# Patient Record
Sex: MALE | Race: WHITE | Employment: OTHER | URBAN - METROPOLITAN AREA
[De-identification: names, ages, dates, MRNs, and addresses within clinical notes are randomized per-mention and may not be internally consistent; named-entity substitution may affect disease eponyms.]

---

## 2017-01-23 ENCOUNTER — HOSPITAL ENCOUNTER (OUTPATIENT)
Age: 72
Setting detail: OBSERVATION
Discharge: HOME | End: 2017-01-25
Attending: EMERGENCY MEDICINE | Admitting: INTERNAL MEDICINE
Payer: MEDICARE

## 2017-01-23 ENCOUNTER — APPOINTMENT (OUTPATIENT)
Dept: CT IMAGING | Age: 72
End: 2017-01-23
Attending: INTERNAL MEDICINE
Payer: MEDICARE

## 2017-01-23 DIAGNOSIS — R55 NEAR SYNCOPE: Primary | ICD-10-CM

## 2017-01-23 LAB
ALBUMIN SERPL BCP-MCNC: 3.8 G/DL (ref 3.5–5)
ALBUMIN/GLOB SERPL: 1 {RATIO} (ref 1.1–2.2)
ALP SERPL-CCNC: 68 U/L (ref 45–117)
ALT SERPL-CCNC: 38 U/L (ref 12–78)
ANION GAP BLD CALC-SCNC: 8 MMOL/L (ref 5–15)
AST SERPL W P-5'-P-CCNC: 24 U/L (ref 15–37)
ATRIAL RATE: 69 BPM
BASOPHILS # BLD AUTO: 0 K/UL (ref 0–0.1)
BASOPHILS # BLD: 0 % (ref 0–1)
BILIRUB SERPL-MCNC: 0.6 MG/DL (ref 0.2–1)
BUN SERPL-MCNC: 16 MG/DL (ref 6–20)
BUN/CREAT SERPL: 15 (ref 12–20)
CALCIUM SERPL-MCNC: 9.2 MG/DL (ref 8.5–10.1)
CALCULATED P AXIS, ECG09: 47 DEGREES
CALCULATED R AXIS, ECG10: 20 DEGREES
CALCULATED T AXIS, ECG11: 53 DEGREES
CHLORIDE SERPL-SCNC: 103 MMOL/L (ref 97–108)
CK MB CFR SERPL CALC: 2.8 % (ref 0–2.5)
CK MB SERPL-MCNC: 2.9 NG/ML (ref 5–25)
CK SERPL-CCNC: 104 U/L (ref 39–308)
CO2 SERPL-SCNC: 28 MMOL/L (ref 21–32)
CREAT SERPL-MCNC: 1.08 MG/DL (ref 0.7–1.3)
DIAGNOSIS, 93000: NORMAL
EOSINOPHIL # BLD: 0.2 K/UL (ref 0–0.4)
EOSINOPHIL NFR BLD: 2 % (ref 0–7)
ERYTHROCYTE [DISTWIDTH] IN BLOOD BY AUTOMATED COUNT: 13.4 % (ref 11.5–14.5)
GLOBULIN SER CALC-MCNC: 3.9 G/DL (ref 2–4)
GLUCOSE SERPL-MCNC: 95 MG/DL (ref 65–100)
HCT VFR BLD AUTO: 39.1 % (ref 36.6–50.3)
HGB BLD-MCNC: 13.3 G/DL (ref 12.1–17)
LYMPHOCYTES # BLD AUTO: 23 % (ref 12–49)
LYMPHOCYTES # BLD: 1.8 K/UL (ref 0.8–3.5)
MCH RBC QN AUTO: 33.1 PG (ref 26–34)
MCHC RBC AUTO-ENTMCNC: 34 G/DL (ref 30–36.5)
MCV RBC AUTO: 97.3 FL (ref 80–99)
MONOCYTES # BLD: 0.6 K/UL (ref 0–1)
MONOCYTES NFR BLD AUTO: 7 % (ref 5–13)
NEUTS SEG # BLD: 5.3 K/UL (ref 1.8–8)
NEUTS SEG NFR BLD AUTO: 68 % (ref 32–75)
P-R INTERVAL, ECG05: 180 MS
PLATELET # BLD AUTO: 231 K/UL (ref 150–400)
POTASSIUM SERPL-SCNC: 3.8 MMOL/L (ref 3.5–5.1)
PROT SERPL-MCNC: 7.7 G/DL (ref 6.4–8.2)
Q-T INTERVAL, ECG07: 450 MS
QRS DURATION, ECG06: 106 MS
QTC CALCULATION (BEZET), ECG08: 492 MS
RBC # BLD AUTO: 4.02 M/UL (ref 4.1–5.7)
SODIUM SERPL-SCNC: 139 MMOL/L (ref 136–145)
TROPONIN I SERPL-MCNC: <0.04 NG/ML
VENTRICULAR RATE, ECG03: 72 BPM
WBC # BLD AUTO: 7.8 K/UL (ref 4.1–11.1)

## 2017-01-23 PROCEDURE — 70450 CT HEAD/BRAIN W/O DYE: CPT

## 2017-01-23 PROCEDURE — 96372 THER/PROPH/DIAG INJ SC/IM: CPT

## 2017-01-23 PROCEDURE — 82550 ASSAY OF CK (CPK): CPT | Performed by: EMERGENCY MEDICINE

## 2017-01-23 PROCEDURE — 85025 COMPLETE CBC W/AUTO DIFF WBC: CPT | Performed by: EMERGENCY MEDICINE

## 2017-01-23 PROCEDURE — 74011250637 HC RX REV CODE- 250/637: Performed by: INTERNAL MEDICINE

## 2017-01-23 PROCEDURE — 74011250636 HC RX REV CODE- 250/636: Performed by: INTERNAL MEDICINE

## 2017-01-23 PROCEDURE — 84484 ASSAY OF TROPONIN QUANT: CPT | Performed by: EMERGENCY MEDICINE

## 2017-01-23 PROCEDURE — 36415 COLL VENOUS BLD VENIPUNCTURE: CPT | Performed by: EMERGENCY MEDICINE

## 2017-01-23 PROCEDURE — 93005 ELECTROCARDIOGRAM TRACING: CPT

## 2017-01-23 PROCEDURE — 99285 EMERGENCY DEPT VISIT HI MDM: CPT

## 2017-01-23 PROCEDURE — 99218 HC RM OBSERVATION: CPT

## 2017-01-23 PROCEDURE — 80053 COMPREHEN METABOLIC PANEL: CPT | Performed by: EMERGENCY MEDICINE

## 2017-01-23 RX ORDER — SODIUM CHLORIDE 0.9 % (FLUSH) 0.9 %
5-10 SYRINGE (ML) INJECTION AS NEEDED
Status: DISCONTINUED | OUTPATIENT
Start: 2017-01-23 | End: 2017-01-25 | Stop reason: HOSPADM

## 2017-01-23 RX ORDER — ASPIRIN 81 MG/1
81 TABLET ORAL DAILY
Status: DISCONTINUED | OUTPATIENT
Start: 2017-01-24 | End: 2017-01-25 | Stop reason: HOSPADM

## 2017-01-23 RX ORDER — LOSARTAN POTASSIUM 25 MG/1
25 TABLET ORAL DAILY
Status: DISCONTINUED | OUTPATIENT
Start: 2017-01-24 | End: 2017-01-25 | Stop reason: HOSPADM

## 2017-01-23 RX ORDER — PRASUGREL 10 MG/1
10 TABLET, FILM COATED ORAL DAILY
Status: DISCONTINUED | OUTPATIENT
Start: 2017-01-24 | End: 2017-01-25 | Stop reason: HOSPADM

## 2017-01-23 RX ORDER — ATENOLOL 25 MG/1
25 TABLET ORAL EVERY EVENING
Status: DISCONTINUED | OUTPATIENT
Start: 2017-01-23 | End: 2017-01-23

## 2017-01-23 RX ORDER — SODIUM CHLORIDE 0.9 % (FLUSH) 0.9 %
5-10 SYRINGE (ML) INJECTION EVERY 8 HOURS
Status: DISCONTINUED | OUTPATIENT
Start: 2017-01-23 | End: 2017-01-25 | Stop reason: HOSPADM

## 2017-01-23 RX ORDER — ATENOLOL 25 MG/1
25 TABLET ORAL EVERY EVENING
COMMUNITY
End: 2017-01-25

## 2017-01-23 RX ORDER — LOSARTAN POTASSIUM AND HYDROCHLOROTHIAZIDE 12.5; 5 MG/1; MG/1
0.5 TABLET ORAL DAILY
COMMUNITY
End: 2018-01-22

## 2017-01-23 RX ORDER — HYDROCHLOROTHIAZIDE 25 MG/1
6.25 TABLET ORAL DAILY
Status: DISCONTINUED | OUTPATIENT
Start: 2017-01-24 | End: 2017-01-25 | Stop reason: HOSPADM

## 2017-01-23 RX ORDER — AMIODARONE HYDROCHLORIDE 200 MG/1
200 TABLET ORAL 2 TIMES DAILY
Status: DISCONTINUED | OUTPATIENT
Start: 2017-01-23 | End: 2017-01-25 | Stop reason: HOSPADM

## 2017-01-23 RX ORDER — PAROXETINE HYDROCHLORIDE 20 MG/1
30 TABLET, FILM COATED ORAL DAILY
Status: DISCONTINUED | OUTPATIENT
Start: 2017-01-24 | End: 2017-01-25 | Stop reason: HOSPADM

## 2017-01-23 RX ORDER — SIMVASTATIN 20 MG/1
20 TABLET, FILM COATED ORAL
Status: DISCONTINUED | OUTPATIENT
Start: 2017-01-23 | End: 2017-01-25 | Stop reason: HOSPADM

## 2017-01-23 RX ORDER — CARVEDILOL 12.5 MG/1
12.5 TABLET ORAL 2 TIMES DAILY WITH MEALS
Status: DISCONTINUED | OUTPATIENT
Start: 2017-01-24 | End: 2017-01-25 | Stop reason: HOSPADM

## 2017-01-23 RX ORDER — AMIODARONE HYDROCHLORIDE 200 MG/1
100 TABLET ORAL DAILY
COMMUNITY
End: 2018-07-14

## 2017-01-23 RX ORDER — ENOXAPARIN SODIUM 100 MG/ML
40 INJECTION SUBCUTANEOUS EVERY 24 HOURS
Status: DISCONTINUED | OUTPATIENT
Start: 2017-01-23 | End: 2017-01-25 | Stop reason: HOSPADM

## 2017-01-23 RX ADMIN — ENOXAPARIN SODIUM 40 MG: 40 INJECTION SUBCUTANEOUS at 18:48

## 2017-01-23 RX ADMIN — Medication 10 ML: at 22:50

## 2017-01-23 RX ADMIN — SIMVASTATIN 20 MG: 20 TABLET, FILM COATED ORAL at 22:49

## 2017-01-23 RX ADMIN — ATENOLOL 25 MG: 25 TABLET ORAL at 18:48

## 2017-01-23 RX ADMIN — AMIODARONE HYDROCHLORIDE 200 MG: 200 TABLET ORAL at 18:48

## 2017-01-23 RX ADMIN — Medication 10 ML: at 18:48

## 2017-01-23 NOTE — ED NOTES
This RN spoke to [address] [place] [address]. They will send someone over as soon as possible to interrogate pace/defib. Unable to provide ETA.

## 2017-01-23 NOTE — IP AVS SNAPSHOT
Current Discharge Medication List  
  
Take these medications at their scheduled times Dose & Instructions Dispensing Information Comments Morning Noon Evening Bedtime  
 amiodarone 200 mg tablet Commonly known as:  CORDARONE Your next dose is: Today, Tomorrow Other:  ____________ Dose:  200 mg Take 200 mg by mouth two (2) times a day. Refills:  0  
     
   
   
   
  
 aspirin delayed-release 81 mg tablet Your next dose is: Today, Tomorrow Other:  ____________ Dose:  81 mg Take 81 mg by mouth daily. Refills:  0  
     
   
   
   
  
 carvedilol 12.5 mg tablet Commonly known as:  Jestine Pellet Your next dose is: Today, Tomorrow Other:  ____________ Dose:  12.5 mg Take 1 Tab by mouth two (2) times daily (with meals). Quantity:  60 Tab Refills:  11  
     
   
   
   
  
 FLORAJEN PO Your next dose is: Today, Tomorrow Other:  ____________ Dose:  1 Cap Take 1 Cap by mouth daily. Refills:  0  
     
   
   
   
  
 losartan-hydroCHLOROthiazide 50-12.5 mg per tablet Commonly known as:  HYZAAR Your next dose is: Today, Tomorrow Other:  ____________ Dose:  0.5 Tab Take 0.5 Tabs by mouth daily. Refills:  0 PAXIL 30 mg tablet Generic drug:  PARoxetine Your next dose is: Today, Tomorrow Other:  ____________ Dose:  30 mg Take 30 mg by mouth daily. Refills:  0  
     
   
   
   
  
 prasugrel 10 mg tablet Commonly known as:  EFFIENT Your next dose is: Today, Tomorrow Other:  ____________ Dose:  10 mg Take 1 Tab by mouth daily. Quantity:  30 Tab Refills:  11  
     
   
   
   
  
 simvastatin 20 mg tablet Commonly known as:  ZOCOR Your next dose is: Today, Tomorrow Other:  ____________ Dose:  20 mg Take 1 Tab by mouth nightly. Quantity:  30 Tab Refills:  11 Where to Get Your Medications Information about where to get these medications is not yet available ! Ask your nurse or doctor about these medications  
  carvedilol 12.5 mg tablet

## 2017-01-23 NOTE — H&P
[facility] [place] [address]   HISTORY AND PHYSICAL       Name:  Allison Carrizales   MR#:  [id number]   :  1945   Account #:  [de-identified]        Date of Adm:  2017       HISTORY OF PRESENT ILLNESS: This 73-year-old man has known   sustained symptomatic VT dating back to at presentation in 2016. During that time, he had stabilization of his rhythm on   amiodarone and had a defibrillator placed by Dr. Itz Love. It was   dual-chamber, [facility]. He has done well since that time. He is known to have brief runs of nonsustained VT, which have been   asymptomatic. Today while exercising at the club, he felt well, but then   after sitting down, he had a syncopal spell which was witnessed. There   was no injury. He felt that his eyes were rolling in the back of his head,   it is not clear how long he was out. The rescue squad was called and   he was brought in. His device has been interrogated with a rate limit   set at 150. No events have been detected. There has been no therapy   delivered. He has not been feeling ill. He has not had orthostatic   symptoms, dizziness, lightheadedness. There has been no chest pain,   presyncope, syncope, and really no sense of palpitations. PAST MEDICAL HISTORY: Situational depression in the past 1990s in   , hypertension, gastroesophageal reflux, melanoma diagnosed in   , resolved stenting of the circumflex , AICD dual-chamber   2006, transurethral resection of the prostate for benign prostatic   hyperplasia, C5-C6 fusion colonoscopies in the past.      MEDICINES AT PRESENTATION:   1. Amiodarone 200 mg 2 times a day. 2. Aspirin 81 mg a day. 3. Atenolol 25 each evening. 4. Lactobacillus. 5. Hyzaar 50/12.5 1/2 a day. 6. Paxil 30 mg a day. 7. Effient 10 mg a day. 8. Simvastatin 20 at bedtime. ALLERGIES: PENICILLINS.     FAMILY HISTORY: Hypertension, psychiatric disorder, hyperlipidemia. SOCIAL HISTORY: , very attentive wife, . Former   smoker, stopping in 2014. Occasional alcohol. REVIEW OF SYSTEMS: Has been assessed and is negative. PHYSICAL EXAMINATION   GENERAL: No acute distress. VITAL SIGNS: Blood pressure 164/96, pulse 76 and regular. HEENT: There is no jugular venous distention. Carotids are full without   bruits. Pupils equal, round, react to light and accommodation. Extraocular muscles full without nystagmus. Thyroid not palpable. No   adenopathy. LUNGS: Clear. HEART: Regular rate and rhythm. No murmur, rub, gallop, or click. ABDOMEN: Soft, nontender without mass or organomegaly. EXTREMITIES: Without edema. Distal pulses are intact. SKIN: Intact. MUSCULOSKELETAL: No skeletal deformities. NEUROLOGIC: Nonfocal. Interrogation of the pacer/defibrillator shows   normal function and no events. DATA: Hemoglobin 13.3, hematocrit 39.1, white count 7800, platelets   111,674. Sodium 139, potassium 3.8, chloride 103, CO2 28, BUN 16,   creatinine 1.08. Troponin less than 0.04. EKG: Normal sinus rhythm, PVCs, nonspecific ST-T wave changes. PROBLEMS:   1. Syncopal episode, etiology is unclear. 2. Known underlying ventricular tachycardia, on therapy, with   continued nonsustained episodes of VT. None detected, but the lower   rate limit is 150.   3. Coronary artery disease. 4. Hypertension. 5. Gastroesophageal reflux. PLANS/RECOMMENDATIONS: Admit to telemetry bed, get serial   enzymes, EKGs, repeat echo, get orthostatic blood pressures consult   electrophysiology, Dr. Colleen Navarrete, and manage accordingly. No change in   baseline medicines at this time.         MD Mary Garcia / Elsa.Ramona   D:  01/23/2017   15:45   T:  01/23/2017   16:07   Job #:  [id number]
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
## 2017-01-23 NOTE — IP AVS SNAPSHOT
[facility] [address] 
[phone] Patient: Gabriel Knight MRN: [id number] EXQ:[id number]/[id number] You are allergic to the following Allergen Reactions Codeine Nausea and Vomiting Penicillins Rash As a child. Recent Documentation Height Weight BMI Smoking Status 1.829 m 84.9 kg 25.38 kg/m2 Former Smoker Emergency Contacts Name Discharge Info Relation Home Work Mobile Lisa Castellon DISCHARGE CAREGIVER [3] Spouse [3]   [phone] About your hospitalization You were admitted on:  January 23, 2017 You last received care in the:  [facility] CV SERVICES UNIT You were discharged on:  January 25, 2017 Unit phone number:  [phone] Why you were hospitalized Your primary diagnosis was:  Not on File Providers Seen During Your Hospitalizations Provider Role Specialty Primary office phone Shahnaz Lawson MD Attending Provider Emergency Medicine [phone] Trena Gonzalez MD Attending Provider Cardiology [phone] Your Primary Care Physician (PCP) Primary Care Physician Office Phone Office Fax Enma Ordonez [phone] Follow-up Information Follow up With Details Comments Contact Info Himanshu Gutierrez MD   [facility] [address] 
[phone] Trena Gonzalez MD In 2 weeks  [address] [place] [address] [address] 
[phone] Current Discharge Medication List  
  
START taking these medications Dose & Instructions Dispensing Information Comments Morning Noon Evening Bedtime  
 carvedilol 12.5 mg tablet Commonly known as:  David Liter Your next dose is: Today, Tomorrow Other:  _________ Dose:  12.5 mg Take 1 Tab by mouth two (2) times daily (with meals). Quantity:  60 Tab Refills:  11  
     
   
   
   
 CONTINUE these medications which have NOT CHANGED Dose & Instructions Dispensing Information Comments Morning Noon Evening Bedtime  
 amiodarone 200 mg tablet Commonly known as:  CORDARONE Your next dose is: Today, Tomorrow Other:  _________ Dose:  200 mg Take 200 mg by mouth two (2) times a day. Refills:  0  
     
   
   
   
  
 aspirin delayed-release 81 mg tablet Your next dose is: Today, Tomorrow Other:  _________ Dose:  81 mg Take 81 mg by mouth daily. Refills:  0  
     
   
   
   
  
 FLORAJEN PO Your next dose is: Today, Tomorrow Other:  _________ Dose:  1 Cap Take 1 Cap by mouth daily. Refills:  0  
     
   
   
   
  
 losartan-hydroCHLOROthiazide 50-12.5 mg per tablet Commonly known as:  HYZAAR Your next dose is: Today, Tomorrow Other:  _________ Dose:  0.5 Tab Take 0.5 Tabs by mouth daily. Refills:  0 PAXIL 30 mg tablet Generic drug:  PARoxetine Your next dose is: Today, Tomorrow Other:  _________ Dose:  30 mg Take 30 mg by mouth daily. Refills:  0  
     
   
   
   
  
 prasugrel 10 mg tablet Commonly known as:  EFFIENT Your next dose is: Today, Tomorrow Other:  _________ Dose:  10 mg Take 1 Tab by mouth daily. Quantity:  30 Tab Refills:  11  
     
   
   
   
  
 simvastatin 20 mg tablet Commonly known as:  ZOCOR Your next dose is: Today, Tomorrow Other:  _________ Dose:  20 mg Take 1 Tab by mouth nightly. Quantity:  30 Tab Refills:  11 STOP taking these medications   
 atenolol 25 mg tablet Commonly known as:  TENORMIN Where to Get Your Medications Information on where to get these meds will be given to you by the nurse or doctor. ! Ask your nurse or doctor about these medications carvedilol 12.5 mg tablet Discharge Instructions [address] [facility],  [address] [facility]    [phone] [place]     [email or web address] Patient Discharge Instructions Brenda Grace / [id number] : 1945 Admitted 2017 Discharged: 2017 Take Home Medications · It is important that you take the medication exactly as they are prescribed. · Keep your medication in the bottles provided by the pharmacist and keep a list of the medication names, dosages, and times to be taken in your wallet. · Do not take other medications without consulting your doctor. BRING ALL OF YOUR MEDICINES TO YOUR OFFICE VISIT with Dr. Rubin Walker. What to do at [facility] Recommended activity: Activity as tolerated. Follow-up with Melissa Grewal MD in 2 weeks Lifestyle changes Do not smoke. Smoking increases your risk of another heart attack. If you need help quitting, talk to your doctor about stop-smoking programs and medicines. These can increase your chances of quitting for good. Eat a heart-healthy diet that is low in cholesterol, saturated fat, and salt, and is full of fruits, vegetables and whole-grains. Eat at least two servings of fish each week. You may get more details about how to eat healthy, but these tips can help you get started. Avoid colds and flu. Get a pneumococcal vaccine shot. If you have had one before, ask your doctor whether you need a second dose. Get a flu shot every fall. If you must be around people with colds or flu, wash your hands often. When should you call for help? Call 911 anytime you think you may need emergency care. For example, call if: 
You have signs of a heart attack. These may include: 
Chest pain or pressure. Sweating. Shortness of breath. Nausea or vomiting. Pain that spreads from the chest to the neck, jaw, or one or both shoulders or arms. Dizziness or lightheadedness. A fast or irregular pulse. After calling 911, chew 1 adult-strength aspirin. Wait for an ambulance. Do not try to drive yourself. You passed out (lost consciousness). You feel like you are having another heart attack. Call your doctor now or seek immediate medical care if: 
You have had any chest pain, even if it has gone away. You have new or increased shortness of breath. You are dizzy or lightheaded, or you feel like you may faint. Watch closely for changes in your health, and be sure to contact your doctor if you have any problem Information obtained by : 
I understand that if any problems occur once I am at home I am to contact my physician. I understand and acknowledge receipt of the instructions indicated above. R.N.'s Signature                                                                  Date/Time Patient or Representative Signature                                                          Date/Time 
 
 
Cruz De La Cruz MD 
 
    
[address] [facility], [address]   [phone] [address]    [email or web address] Discharge Orders None Patient [employer] Player Connect Announcement We are excited to announce that we are making your provider's discharge notes available to you in [employer]. You will see these notes when they are completed and signed by the physician that discharged you from your recent hospital stay. If you have any questions or concerns about any information you see in Patient Feedhart, please call the Health Information Department where you were seen or reach out to your Primary Care Provider for more information about your plan of care. Introducing [facility] [place] [facility]! Dear Romario Yanes: Thank you for requesting a [employer] account. Our records indicate that you already have an active [employer] account. You can access your account anytime at [email or web address]. [employer] Did you know that you can access your hospital and ER discharge instructions at any time in [employer]? You can also review all of your test results from your hospital stay or ER visit. Additional Information If you have questions, please visit the Frequently Asked Questions section of the [employer] website at [email or web address] [employer]/. Remember, [employer] is NOT to be used for urgent needs. For medical emergencies, dial 911. Now available from your iPhone and Android! General Information Please provide this summary of care documentation to your next provider. Patient Signature:  ____________________________________________________________ Date:  ____________________________________________________________  
  
Devan Sharp Provider Signature:  ____________________________________________________________ Date:  ____________________________________________________________

## 2017-01-23 NOTE — ED TRIAGE NOTES
Pt stated he was having coffee and passed out, pt stated he has hx of V tach which is why he has a defibrillator in , pt stated defibrillator did not go off, denies cp, denies sob, denies fever, denies n/v

## 2017-01-23 NOTE — PROGRESS NOTES
Admission Medication Reconciliation:    Information obtained from: patient, pictures of pill bottles on phone    Significant PMH/Disease States:   Past Medical History   Diagnosis Date    BPH (benign prostatic hyperplasia)      s/p TURP    CAD (coronary artery disease)      stent to cx 2014    Cancer ([facility] [address] 1985     superficial spreading melanoma    GERD (gastroesophageal reflux disease)     Hypertension     Psychiatric disorder 1990's, 2014     situational depression       Chief Complaint for this Admission:    Chief Complaint   Patient presents with    Syncope       Allergies:  Codeine and Penicillins    Prior to Admission Medications:   Prior to Admission Medications   Prescriptions Last Dose Informant Patient Reported? Taking? LACTOBACILLUS ACIDOPHILUS (FLORAJEN PO) 1/23/2017 at am  Yes Yes   Sig: Take 1 Cap by mouth daily. PARoxetine (PAXIL) 30 mg tablet 1/23/2017 at am  Yes Yes   Sig: Take 30 mg by mouth daily. amiodarone (CORDARONE) 200 mg tablet 1/23/2017 at am  Yes Yes   Sig: Take 200 mg by mouth two (2) times a day. aspirin delayed-release 81 mg tablet 1/23/2017 at am  Yes Yes   Sig: Take 81 mg by mouth daily. atenolol (TENORMIN) 25 mg tablet 1/22/2017 at pm  Yes Yes   Sig: Take 25 mg by mouth every evening. losartan-hydroCHLOROthiazide (HYZAAR) 50-12.5 mg per tablet 1/23/2017 at am  Yes Yes   Sig: Take 0.5 Tabs by mouth daily. prasugrel (EFFIENT) 10 mg tablet 1/23/2017 at am  No Yes   Sig: Take 1 Tab by mouth daily. simvastatin (ZOCOR) 20 mg tablet 1/22/2017 at pm  No Yes   Sig: Take 1 Tab by mouth nightly. Facility-Administered Medications: None         Comments/Recommendations:  Reviewed PTA meds with patient. Changed atenolol to 25 mg (from 50mg). Pt was not certain of Hyzaar dose - he is now cutting it in half but thought he might be taking it twice daily. Fill history looks like he's been getting 15 tabs every 30 days which indicates just once daily.

## 2017-01-23 NOTE — ED NOTES
TRANSFER - OUT REPORT:    Verbal report given to Cain Bee, RN(name) on Gabriel Knight  being transferred to [facility] (unit) for routine progression of care       Report consisted of patients Situation, Background, Assessment and   Recommendations(SBAR). Information from the following report(s) SBAR, ED Summary, [facility] and Recent Results was reviewed with the receiving nurse. Lines:   Peripheral IV 01/23/17 Right Antecubital (Active)        Opportunity for questions and clarification was provided.       Patient transported with:   Monitor

## 2017-01-24 LAB
ANION GAP BLD CALC-SCNC: 6 MMOL/L (ref 5–15)
BASOPHILS # BLD AUTO: 0.1 K/UL (ref 0–0.1)
BASOPHILS # BLD: 1 % (ref 0–1)
BUN SERPL-MCNC: 15 MG/DL (ref 6–20)
BUN/CREAT SERPL: 16 (ref 12–20)
CALCIUM SERPL-MCNC: 9 MG/DL (ref 8.5–10.1)
CHLORIDE SERPL-SCNC: 105 MMOL/L (ref 97–108)
CK MB CFR SERPL CALC: 2.8 % (ref 0–2.5)
CK MB SERPL-MCNC: 2.3 NG/ML (ref 5–25)
CK SERPL-CCNC: 83 U/L (ref 39–308)
CO2 SERPL-SCNC: 30 MMOL/L (ref 21–32)
CREAT SERPL-MCNC: 0.91 MG/DL (ref 0.7–1.3)
EOSINOPHIL # BLD: 0.3 K/UL (ref 0–0.4)
EOSINOPHIL NFR BLD: 4 % (ref 0–7)
ERYTHROCYTE [DISTWIDTH] IN BLOOD BY AUTOMATED COUNT: 13.3 % (ref 11.5–14.5)
GLUCOSE SERPL-MCNC: 98 MG/DL (ref 65–100)
HCT VFR BLD AUTO: 37.9 % (ref 36.6–50.3)
HGB BLD-MCNC: 12.9 G/DL (ref 12.1–17)
LYMPHOCYTES # BLD AUTO: 32 % (ref 12–49)
LYMPHOCYTES # BLD: 2.3 K/UL (ref 0.8–3.5)
MCH RBC QN AUTO: 32.7 PG (ref 26–34)
MCHC RBC AUTO-ENTMCNC: 34 G/DL (ref 30–36.5)
MCV RBC AUTO: 96.2 FL (ref 80–99)
MONOCYTES # BLD: 0.7 K/UL (ref 0–1)
MONOCYTES NFR BLD AUTO: 10 % (ref 5–13)
NEUTS SEG # BLD: 4 K/UL (ref 1.8–8)
NEUTS SEG NFR BLD AUTO: 53 % (ref 32–75)
PLATELET # BLD AUTO: 218 K/UL (ref 150–400)
POTASSIUM SERPL-SCNC: 3.4 MMOL/L (ref 3.5–5.1)
RBC # BLD AUTO: 3.94 M/UL (ref 4.1–5.7)
SODIUM SERPL-SCNC: 141 MMOL/L (ref 136–145)
T4 FREE SERPL-MCNC: 1.1 NG/DL (ref 0.8–1.5)
TROPONIN I SERPL-MCNC: <0.04 NG/ML
TSH SERPL DL<=0.05 MIU/L-ACNC: 1.63 UIU/ML (ref 0.36–3.74)
WBC # BLD AUTO: 7.3 K/UL (ref 4.1–11.1)

## 2017-01-24 PROCEDURE — 80048 BASIC METABOLIC PNL TOTAL CA: CPT | Performed by: INTERNAL MEDICINE

## 2017-01-24 PROCEDURE — 36415 COLL VENOUS BLD VENIPUNCTURE: CPT | Performed by: INTERNAL MEDICINE

## 2017-01-24 PROCEDURE — 74011250637 HC RX REV CODE- 250/637: Performed by: INTERNAL MEDICINE

## 2017-01-24 PROCEDURE — 84439 ASSAY OF FREE THYROXINE: CPT | Performed by: INTERNAL MEDICINE

## 2017-01-24 PROCEDURE — 96372 THER/PROPH/DIAG INJ SC/IM: CPT

## 2017-01-24 PROCEDURE — 74011250636 HC RX REV CODE- 250/636: Performed by: INTERNAL MEDICINE

## 2017-01-24 PROCEDURE — 84484 ASSAY OF TROPONIN QUANT: CPT | Performed by: INTERNAL MEDICINE

## 2017-01-24 PROCEDURE — 93306 TTE W/DOPPLER COMPLETE: CPT

## 2017-01-24 PROCEDURE — 82550 ASSAY OF CK (CPK): CPT | Performed by: INTERNAL MEDICINE

## 2017-01-24 PROCEDURE — 84443 ASSAY THYROID STIM HORMONE: CPT | Performed by: INTERNAL MEDICINE

## 2017-01-24 PROCEDURE — 85025 COMPLETE CBC W/AUTO DIFF WBC: CPT | Performed by: INTERNAL MEDICINE

## 2017-01-24 PROCEDURE — 99218 HC RM OBSERVATION: CPT

## 2017-01-24 RX ADMIN — Medication 10 ML: at 06:06

## 2017-01-24 RX ADMIN — CARVEDILOL 12.5 MG: 12.5 TABLET, FILM COATED ORAL at 10:21

## 2017-01-24 RX ADMIN — CARVEDILOL 12.5 MG: 12.5 TABLET, FILM COATED ORAL at 19:12

## 2017-01-24 RX ADMIN — Medication 10 ML: at 21:35

## 2017-01-24 RX ADMIN — AMIODARONE HYDROCHLORIDE 200 MG: 200 TABLET ORAL at 19:12

## 2017-01-24 RX ADMIN — PRASUGREL HYDROCHLORIDE 10 MG: 10 TABLET, FILM COATED ORAL at 10:20

## 2017-01-24 RX ADMIN — ASPIRIN 81 MG: 81 TABLET, COATED ORAL at 10:20

## 2017-01-24 RX ADMIN — AMIODARONE HYDROCHLORIDE 200 MG: 200 TABLET ORAL at 10:20

## 2017-01-24 RX ADMIN — PAROXETINE HYDROCHLORIDE 30 MG: 20 TABLET, FILM COATED ORAL at 10:20

## 2017-01-24 RX ADMIN — SIMVASTATIN 20 MG: 20 TABLET, FILM COATED ORAL at 21:35

## 2017-01-24 RX ADMIN — ENOXAPARIN SODIUM 40 MG: 40 INJECTION SUBCUTANEOUS at 19:11

## 2017-01-24 RX ADMIN — HYDROCHLOROTHIAZIDE 6.25 MG: 25 TABLET ORAL at 10:19

## 2017-01-24 RX ADMIN — Medication 1 CAPSULE: at 10:20

## 2017-01-24 RX ADMIN — LOSARTAN POTASSIUM 25 MG: 25 TABLET ORAL at 10:21

## 2017-01-24 NOTE — PROGRESS NOTES
1657: TRANSFER - IN REPORT:    Verbal report received from Le(name) on Palomino Ricky  being received from ED (unit) for routine progression of care      Report consisted of patients Situation, Background, Assessment and   Recommendations(SBAR). Information from the following report(s) SBAR, Kardex, [facility] and Recent Results was reviewed with the receiving nurse. Opportunity for questions and clarification was provided. Assessment completed upon patients arrival to unit and care assumed. 1738: Received pt from ED. Tele applied, vitals obtained. Oriented to room and surroundings. No needs at this time, call bell in reach. Orthostatic BP as below. Vitals:    01/23/17 1630 01/23/17 1738 01/23/17 1740 01/23/17 1742   BP: 144/78 (!) 189/93 (!) 169/113 (!) 155/97   BP 1 Location:  Left arm Left arm Left arm   BP Patient Position:  Supine Sitting Standing   Pulse: 70 60 64 68   Resp: 16 18     Temp:  97.7 °F (36.5 °C)     SpO2: 97% 94% 97% 94%   Weight:  86.6 kg (190 lb 14.7 oz)     Height:           1930: Bedside and Verbal shift change report given to Robby Patel (oncoming nurse) by Bhupendra Ray (offgoing nurse). Report included the following information SBAR, Kardex, MAR and Recent Results.

## 2017-01-24 NOTE — PROGRESS NOTES
Cardiology Progress Note  2017     Admit Date: 2017  Admit Diagnosis: syncope  CC: none currently    Assessment:   Active Problems:    * No active hospital problems. *    Plan:   No spells but periods of NSVT persist.  LVEF 55-60%. Had 81EUZD drop in systolic BP with orthostatic change. But from 237 to 754 systolic. No changes for now. Volume status:euvolemic  Renal function: stable    For other plans, see orders.   Subjective: Mukesh Greenwood reports   Chest Pain:  [x]   none,  consistent with  []   non-cardiac   []   atypical   []   angina             [x]   none now    []      on-going  Dyspnea: [x]   none  []   at rest  []   with exertion     []   improved   []   unchanged   []   worsening  PND:       [x]   none  []   overnight    Orthopnea: [x]   none  []   improved  []   unchanged  []   worsening  Presyncope: [x]   none   []   improved    []   unchanged    []   worsening  Ambulated in hallway without symptoms  []   Yes  Ambulated in room without symptoms  []   Yes    Objective:    Physical Exam:  Overall VSSAF;    Visit Vitals    /82 (BP 1 Location: Right arm, BP Patient Position: Supine)    Pulse 60    Temp 98.3 °F (36.8 °C)    Resp 18    Ht 6' (1.829 m)    Wt 85 kg (187 lb 6.3 oz)    SpO2 98%    BMI 25.41 kg/m2     Temp (24hrs), Av °F (36.7 °C), Min:97.5 °F (36.4 °C), Max:98.3 °F (36.8 °C)    Patient Vitals for the past 8 hrs:   Pulse   17 1553 60   17 1404 66   17 1403 63   17 1402 61   17 1250 61    Patient Vitals for the past 8 hrs:   Resp   17 1553 18   17 1250 18    Patient Vitals for the past 8 hrs:   BP   17 1553 127/82   17 1404 142/87   17 1403 (!) 177/94   17 1402 (!) 177/100        Intake/Output Summary (Last 24 hours) at 17 1704  Last data filed at 17 3036   Gross per 24 hour   Intake              720 ml   Output                0 ml   Net              720 ml       General Appearance: Well developed, well nourished, no acute distress. Ears/Nose/Mouth/Throat:   Normal MM; anicteric. JVP: WNL   Resp:   Lungs clear to auscultation bilaterally. Nl resp effort. Cardiovascular:  RRR, S1, S2 normal, no new murmur. No gallop or rub. Abdomen:   Soft, non-tender, bowel sounds are present. Extremities: No edema bilaterally. Skin:  Neuro: Warm and dry. A/O x3, grossly nonfocal    []      cath site intact w/o hematoma or bruit; distal pulse unchanged. Data Review:     Telemetry independently reviewed : []   sinus  []   chronic afib   []   par afib  []      NSVT    ECG independently reviewed:  []   NSR   []   no significant changes  []   no new ECG provided for review  Lab results reviewed as noted below. Current medications reviewed as noted below. No results for input(s): PH, PCO2, PO2 in the last 72 hours. Recent Labs      01/24/17   0618  01/23/17   1138   CPK  83  104   CKMB  2.3  2.9   TROIQ  <0.04  <0.04     Recent Labs      01/24/17   0618  01/23/17   1138   NA  141  139   K  3.4*  3.8   CL  105  103   CO2  30  28   BUN  15  16   CREA  0.91  1.08   GLU  98  95   CA  9.0  9.2   ALB   --   3.8   WBC  7.3  7.8   HGB  12.9  13.3   HCT  37.9  39.1   PLT  218  231     Recent Labs      01/23/17   1138   SGOT  24   ALT  38   AP  68   TBILI  0.6   TP  7.7   ALB  3.8   GLOB  3.9     No results for input(s): INR, PTP, APTT in the last 72 hours. No lab exists for component: INREXT   No results for input(s): FE, TIBC, PSAT, FERR in the last 72 hours.    No results found for: GLUCPOC    Current Facility-Administered Medications   Medication Dose Route Frequency    amiodarone (CORDARONE) tablet 200 mg  200 mg Oral BID    aspirin delayed-release tablet 81 mg  81 mg Oral DAILY    PARoxetine (PAXIL) tablet 30 mg  30 mg Oral DAILY    prasugrel (EFFIENT) tablet 10 mg  10 mg Oral DAILY    simvastatin (ZOCOR) tablet 20 mg  20 mg Oral QHS    sodium chloride (NS) flush 5-10 mL  5-10 mL IntraVENous Q8H    sodium chloride (NS) flush 5-10 mL  5-10 mL IntraVENous PRN    enoxaparin (LOVENOX) injection 40 mg  40 mg SubCUTAneous Q24H    lactobac ac& pc-s.therm-b.anim (REMA Q/RISAQUAD)  1 Cap Oral DAILY    losartan (COZAAR) tablet 25 mg  25 mg Oral DAILY    And    hydroCHLOROthiazide (HYDRODIURIL) tablet 6.25 mg  6.25 mg Oral DAILY    carvedilol (COREG) tablet 12.5 mg  12.5 mg Oral BID WITH MEALS        Melissa Grewal MD

## 2017-01-24 NOTE — PROGRESS NOTES
Problem: Falls - Risk of  Goal: *Absence of falls  Outcome: Progressing Towards Goal  Belongings at bedside, call bell within reach. Bed in locked, low position. Wearing non skid footwear. Problem: Arrhythmia Pathway (Adult)  Goal: *Absence of arrhythmia  Outcome: Progressing Towards Goal  Patient has been free from arrythmia. He has naomi paced in the 60's. Problem: Pressure Ulcer - Risk of  Goal: *Prevention of pressure ulcer  Outcome: Progressing Towards Goal  Able to turn self in bed, able to transfer from bed to chair with no assistance. 0800: Bedside shift change report given to Malick Min (oncoming nurse) by Marlon Boggs (offgoing nurse). Report included the following information SBAR, Kardex, Intake/Output, MAR and Recent Results.

## 2017-01-24 NOTE — PROGRESS NOTES
Problem: Arrhythmia Pathway (Adult)  Goal: *Absence of arrhythmia  Outcome: Progressing Towards Goal  Some pacing noted on tele. No runs of VT ,but occasional PVCs noted. Problem: Syncope  Goal: Decrease or eliminate episodes of syncope  Outcome: Progressing Towards Goal  ICD/pacer interrogated to r/o any malfunctioning.   Beta blocker changed to coreg from atenolol per Cardiology

## 2017-01-24 NOTE — CONSULTS
[facility] CARDIAC ELECTROPHYSIOLOGY CONSULT              Date of  Admission: 2017 12:04 PM            Assessment and Plan: Stacey Campos is admitted with syncope. # Syncope - concerning for VT. His device was reprogrammed to a lower zone of 141 bpm.  Will need to review and if histograms show lower HR can put in a VT monitor zone below this. - He is already on amidoarone. Will switch atenolol to coreg. # VT - as above. # ICM - EF was 60% on cath, but suspect small scar given monomorphic VT. Echo is pending. If he does ok on coreg and amiodarone. Can d/c home in 1-2 days. REASON FOR CONSULT: Syncope  Primary Cardiologist: Kong Ahn    HPI: 66 yom with prior CAD and VT, s/p ICD admitted with syncope. He had a sleep study last week and reportedly had NSVT. Saw Dr. Kong Ahn last week. Pt went to the gym and felt lightheaded and a very short syncopal episode. Came to ER. Here device check reportedly showed no arrhythmias (check is unavailable for my review). He had one similar episode a year ago at the time had VT and underwent Dual chamber ICD with Dr. Justice Richards. His last cath was a year ago which noted patent stents. EF was normal on LV gram.  His last office remote check showed VT treated with ATP. VT rate was 155 bpm.   Here on tele he has had NSVT that is at a faster rate than 155 bpm.    SH: Retired . Moved last . Lots of stress. Brother in law passed recently and has a  in MD Kirti this Friday.       Patient Active Problem List    Diagnosis Date Noted    Encounter for long-term (current) drug use 2016    VT [facility]ventricular tachycardia) ([facility] [address] 2016    ASHD (arteriosclerotic heart disease) 2015    Hypertension, benign 2015    VPC's 2015      Nilson Spencer MD  Past Medical History   Diagnosis Date    BPH (benign prostatic hyperplasia)      s/p TURP    CAD (coronary artery disease)      stent to cx 2014    Cancer [facility]) 1985 superficial spreading melanoma    GERD (gastroesophageal reflux disease)     Hypertension     Psychiatric disorder 1990's, 2014     situational depression      Past Surgical History   Procedure Laterality Date    Pr cardiac surg procedure unlist  06/17/2015     BHUPINDER stent    Hx prostatectomy  2013     TURP    Hx orthopaedic       C5/C6 fusion, bone spur    Hx turp      Hx colonoscopy      Hx implantable cardioverter defibrillator       Allergies   Allergen Reactions    Codeine Nausea and Vomiting    Penicillins Rash     As a child. Family History   Problem Relation Age of Onset    Hypertension Mother     Psychiatric Disorder Mother     Heart Disease Father 80   [facility] Sister     Psychiatric Disorder Sister    [facility] Brother     Psychiatric Disorder Brother       Social History     Social History    Marital status:      Spouse name: N/A    Number of children: N/A    Years of education: N/A     Occupational History    Not on file.      Social History Main Topics    Smoking status: Former Smoker     Quit date: 7/7/2014    Smokeless tobacco: Former User     Quit date: 7/7/2010    Alcohol use 4.8 oz/week     8 Shots of liquor per week      Comment: vodka or bourbon    Drug use: No    Sexual activity: Not on file     Other Topics Concern    Not on file     Social History Narrative     Current Facility-Administered Medications   Medication Dose Route Frequency    amiodarone (CORDARONE) tablet 200 mg  200 mg Oral BID    [START ON 1/24/2017] aspirin delayed-release tablet 81 mg  81 mg Oral DAILY    atenolol (TENORMIN) tablet 25 mg  25 mg Oral QPM    [START ON 1/24/2017] PARoxetine (PAXIL) tablet 30 mg  30 mg Oral DAILY    [START ON 1/24/2017] prasugrel (EFFIENT) tablet 10 mg  10 mg Oral DAILY    simvastatin (ZOCOR) tablet 20 mg  20 mg Oral QHS    sodium chloride (NS) flush 5-10 mL  5-10 mL IntraVENous Q8H    sodium chloride (NS) flush 5-10 mL  5-10 mL IntraVENous PRN    enoxaparin (LOVENOX) injection 40 mg  40 mg SubCUTAneous Q24H    [START ON 1/24/2017] lactobac ac& pc-s.therm-b.anim (REMA Q/RISAQUAD)  1 Cap Oral DAILY    [START ON 1/24/2017] losartan (COZAAR) tablet 25 mg  25 mg Oral DAILY    And    [START ON 1/24/2017] hydroCHLOROthiazide (HYDRODIURIL) tablet 6.25 mg  6.25 mg Oral DAILY           Review of Symptoms:  A comprehensive review of systems was negative in 11 points other than stated above in HPI. Physical Exam    Visit Vitals    /88 (BP 1 Location: Left arm, BP Patient Position: At rest)    Pulse 60    Temp 98.3 °F (36.8 °C)    Resp 18    Ht 6' (1.829 m)    Wt 86.6 kg (190 lb 14.7 oz)    SpO2 96%    BMI 25.89 kg/m2     Skin warm and dry  HEENT: WNL  Oropharynx without exudate. Neck supple  Lungs clear  Heart - RRR, Normal S1/ S2   No Mummurs, click or Rubs  No S3 or S4  Abdomen soft and non tender,   Pulses 2+ throughout   Neuro:  Normal facial grimace,  Moves all extremities.    AAAO   Psych: unanxious    Labs:   Recent Results (from the past 24 hour(s))   EKG, 12 LEAD, INITIAL    Collection Time: 01/23/17 11:25 AM   Result Value Ref Range    Ventricular Rate 72 BPM    Atrial Rate 69 BPM    P-R Interval 180 ms    QRS Duration 106 ms    Q-T Interval 450 ms    QTC Calculation (Bezet) 492 ms    Calculated P Axis 47 degrees    Calculated R Axis 20 degrees    Calculated T Axis 53 degrees    Diagnosis       Normal sinus rhythm premature ventricular complexes  Prolonged QT  When compared with ECG of 08-JUL-2016 14:50,  Current undetermined rhythm precludes rhythm comparison, needs review  T wave amplitude has decreased in Anterior leads  Confirmed by Marcela Nuno MD ([id number]) on 1/23/2017 6:06:41 PM     CBC WITH AUTOMATED DIFF    Collection Time: 01/23/17 11:38 AM   Result Value Ref Range    WBC 7.8 4.1 - 11.1 K/uL    RBC 4.02 (L) 4.10 - 5.70 M/uL    HGB 13.3 12.1 - 17.0 g/dL    HCT 39.1 36.6 - 50.3 %    MCV 97.3 80.0 - 99.0 FL    MCH 33.1 26.0 - 34.0 PG    MCHC 34.0 30.0 - 36.5 g/dL    RDW 13.4 11.5 - 14.5 %    PLATELET 957 461 - 261 K/uL    NEUTROPHILS 68 32 - 75 %    LYMPHOCYTES 23 12 - 49 %    MONOCYTES 7 5 - 13 %    EOSINOPHILS 2 0 - 7 %    BASOPHILS 0 0 - 1 %    ABS. NEUTROPHILS 5.3 1.8 - 8.0 K/UL    ABS. LYMPHOCYTES 1.8 0.8 - 3.5 K/UL    ABS. MONOCYTES 0.6 0.0 - 1.0 K/UL    ABS. EOSINOPHILS 0.2 0.0 - 0.4 K/UL    ABS. BASOPHILS 0.0 0.0 - 0.1 K/UL   METABOLIC PANEL, COMPREHENSIVE    Collection Time: 01/23/17 11:38 AM   Result Value Ref Range    Sodium 139 136 - 145 mmol/L    Potassium 3.8 3.5 - 5.1 mmol/L    Chloride 103 97 - 108 mmol/L    CO2 28 21 - 32 mmol/L    Anion gap 8 5 - 15 mmol/L    Glucose 95 65 - 100 mg/dL    BUN 16 6 - 20 MG/DL    Creatinine 1.08 0.70 - 1.30 MG/DL    BUN/Creatinine ratio 15 12 - 20      GFR est AA >60 >60 ml/min/1.73m2    GFR est non-AA >60 >60 ml/min/1.73m2    Calcium 9.2 8.5 - 10.1 MG/DL    Bilirubin, total 0.6 0.2 - 1.0 MG/DL    ALT 38 12 - 78 U/L    AST 24 15 - 37 U/L    Alk.  phosphatase 68 45 - 117 U/L    Protein, total 7.7 6.4 - 8.2 g/dL    Albumin 3.8 3.5 - 5.0 g/dL    Globulin 3.9 2.0 - 4.0 g/dL    A-G Ratio 1.0 (L) 1.1 - 2.2     TROPONIN I    Collection Time: 01/23/17 11:38 AM   Result Value Ref Range    Troponin-I, Qt. <0.04 <0.05 ng/mL   CK W/ CKMB & INDEX    Collection Time: 01/23/17 11:38 AM   Result Value Ref Range     39 - 308 U/L    CK - MB 2.9 <3.6 NG/ML    CK-MB Index 2.8 (H) 0 - 2.5         Cardiographics    Telemetry: apaced, and SR. NSVT  ECG: SR with PVC,   Echocardiogram: pending

## 2017-01-25 VITALS
OXYGEN SATURATION: 99 % | HEIGHT: 72 IN | HEART RATE: 63 BPM | BODY MASS INDEX: 25.35 KG/M2 | WEIGHT: 187.17 LBS | TEMPERATURE: 98.2 F | SYSTOLIC BLOOD PRESSURE: 135 MMHG | RESPIRATION RATE: 16 BRPM | DIASTOLIC BLOOD PRESSURE: 70 MMHG

## 2017-01-25 PROCEDURE — 74011250637 HC RX REV CODE- 250/637: Performed by: INTERNAL MEDICINE

## 2017-01-25 PROCEDURE — 99218 HC RM OBSERVATION: CPT

## 2017-01-25 RX ORDER — CARVEDILOL 12.5 MG/1
12.5 TABLET ORAL 2 TIMES DAILY WITH MEALS
Qty: 60 TAB | Refills: 11 | Status: SHIPPED | OUTPATIENT
Start: 2017-01-25 | End: 2018-07-14

## 2017-01-25 RX ORDER — ACETAMINOPHEN 325 MG/1
650 TABLET ORAL
Status: DISCONTINUED | OUTPATIENT
Start: 2017-01-25 | End: 2017-01-25 | Stop reason: HOSPADM

## 2017-01-25 RX ADMIN — CARVEDILOL 12.5 MG: 12.5 TABLET, FILM COATED ORAL at 09:09

## 2017-01-25 RX ADMIN — AMIODARONE HYDROCHLORIDE 200 MG: 200 TABLET ORAL at 09:09

## 2017-01-25 RX ADMIN — Medication 10 ML: at 05:14

## 2017-01-25 RX ADMIN — Medication 1 CAPSULE: at 09:10

## 2017-01-25 RX ADMIN — PRASUGREL HYDROCHLORIDE 10 MG: 10 TABLET, FILM COATED ORAL at 09:09

## 2017-01-25 RX ADMIN — PAROXETINE HYDROCHLORIDE 30 MG: 20 TABLET, FILM COATED ORAL at 09:10

## 2017-01-25 RX ADMIN — LOSARTAN POTASSIUM 25 MG: 25 TABLET ORAL at 09:09

## 2017-01-25 RX ADMIN — ASPIRIN 81 MG: 81 TABLET, COATED ORAL at 09:09

## 2017-01-25 RX ADMIN — HYDROCHLOROTHIAZIDE 6.25 MG: 25 TABLET ORAL at 09:10

## 2017-01-25 RX ADMIN — ACETAMINOPHEN 650 MG: 325 TABLET, FILM COATED ORAL at 09:09

## 2017-01-25 NOTE — PROGRESS NOTES
2100: Orthostatics BP  Lyin/90; HR: 60  Sittin/77; HR: 63  Standin/86; HR 63    0730: Bedside and Verbal shift change report given to Heide Frazier (oncoming nurse) by Evan Perry (offgoing nurse). Report included the following information SBAR, Kardex, Intake/Output, MAR, Recent Results and Cardiac Rhythm Paced.

## 2017-01-25 NOTE — DISCHARGE INSTRUCTIONS
[address] [place] [address],  [facility]    [phone]  Adan Contreras     [email or web address]    Patient Discharge Instructions    Beena Wallace / [id number] : 1945    Admitted 2017 Discharged: 2017     Take Home Medications            · It is important that you take the medication exactly as they are prescribed. · Keep your medication in the bottles provided by the pharmacist and keep a list of the medication names, dosages, and times to be taken in your wallet. · Do not take other medications without consulting your doctor. BRING ALL OF YOUR MEDICINES TO YOUR OFFICE VISIT with Dr. Willie Johnson. What to do at Home    Recommended activity: Activity as tolerated. Follow-up with Carol Rodgers MD in 2 weeks        Lifestyle changes  Do not smoke. Smoking increases your risk of another heart attack. If you need help quitting, talk to your doctor about stop-smoking programs and medicines. These can increase your chances of quitting for good. Eat a heart-healthy diet that is low in cholesterol, saturated fat, and salt, and is full of fruits, vegetables and whole-grains. Eat at least two servings of fish each week. You may get more details about how to eat healthy, but these tips can help you get started. Avoid colds and flu. Get a pneumococcal vaccine shot. If you have had one before, ask your doctor whether you need a second dose. Get a flu shot every fall. If you must be around people with colds or flu, wash your hands often. When should you call for help? Call 911 anytime you think you may need emergency care. For example, call if:  You have signs of a heart attack. These may include:  Chest pain or pressure. Sweating. Shortness of breath. Nausea or vomiting. Pain that spreads from the chest to the neck, jaw, or one or both shoulders or arms. Dizziness or lightheadedness. A fast or irregular pulse. After calling 911, chew 1 adult-strength aspirin. Wait for an ambulance.  Do not try to drive yourself. You passed out (lost consciousness). You feel like you are having another heart attack. Call your doctor now or seek immediate medical care if:  You have had any chest pain, even if it has gone away. You have new or increased shortness of breath. You are dizzy or lightheaded, or you feel like you may faint. Watch closely for changes in your health, and be sure to contact your doctor if you have any problem      Information obtained by :  I understand that if any problems occur once I am at home I am to contact my physician. I understand and acknowledge receipt of the instructions indicated above. R.N.'s Signature                                                                  Date/Time                                                                                                                                              Patient or Representative Signature                                                          Date/Time      Alexandrea Castano MD         [address] [address]   [phone]  [address]    [email or web address]

## 2017-01-25 NOTE — PROGRESS NOTES
0730: Bedside and Verbal shift change report given to Rob Huang (oncoming nurse) by Jessica Haddad (offgoing nurse). Report included the following information SBAR, Kardex, MAR and Recent Results. 1030: I have reviewed discharge instructions with the patient. The patient verbalized understanding. Discharge medications reviewed with patient and appropriate educational materials and side effects teaching were provided.

## 2017-01-25 NOTE — DISCHARGE SUMMARY
Cardiology Discharge Summary     Patient ID:  Stacey Campos  [id number]  46 y.o.  1945    Admit Date: 1/23/2017    Discharge Date: 1/25/2017     Admitting Physician: Sheyla Johnson MD     Discharge Physician: Sheyla Johnson MD    Admission Diagnoses: syncope    Discharge Diagnoses: Active Problems:    * No active hospital problems. *      Discharge Condition: Good    Cardiology Procedures this Admission:  EchoCardiogram    Hospital Course: Admitted with syncope and hx sustained VT, stable CAD and found to have preserved LV function on ECHO. No CP and negative enzymes. Had periods of brief and asymptomatic VT in hospital.  No events recorded on interrogation of AICD. Meds adjusted and may need VT ablation in future. Instructed against any driving or travel away from easy access to medical care. Consults: Cardiology EP:  Dr. Duke Rossi    Discharge Exam:     Visit Vitals    /70 (BP 1 Location: Left arm, BP Patient Position: At rest)    Pulse 63    Temp 98.2 °F (36.8 °C)    Resp 16    Ht 6' (1.829 m)    Wt 84.9 kg (187 lb 2.7 oz)    SpO2 99%    BMI 25.38 kg/m2     General Appearance:  Well developed, well nourished, in no acute distress. Ears/Nose/Mouth/Throat:   Hearing grossly normal.         Neck: Supple. Chest:   Lungs clear to auscultation bilaterally. Normal resp effort. Cardiovascular:  Regular rate and rhythm, S1, S2 normal, no new murmur. Abdomen:   Soft, non-tender, bowel sounds are present. Extremities: No edema bilaterally. Neuro:  Skin: A/O x 3, grossly nonfocal. Normal mood/affect. Warm and dry. Disposition: home    Patient Instructions:   Current Discharge Medication List      START taking these medications    Details   carvedilol (COREG) 12.5 mg tablet Take 1 Tab by mouth two (2) times daily (with meals).   Qty: 60 Tab, Refills: 11         CONTINUE these medications which have NOT CHANGED    Details   amiodarone (CORDARONE) 200 mg tablet Take 200 mg by mouth two (2) times a day. losartan-hydroCHLOROthiazide (HYZAAR) 50-12.5 mg per tablet Take 0.5 Tabs by mouth daily. aspirin delayed-release 81 mg tablet Take 81 mg by mouth daily. simvastatin (ZOCOR) 20 mg tablet Take 1 Tab by mouth nightly. Qty: 30 Tab, Refills: 11      prasugrel (EFFIENT) 10 mg tablet Take 1 Tab by mouth daily. Qty: 30 Tab, Refills: 11      LACTOBACILLUS ACIDOPHILUS (FLORAJEN PO) Take 1 Cap by mouth daily. PARoxetine (PAXIL) 30 mg tablet Take 30 mg by mouth daily. STOP taking these medications       atenolol (TENORMIN) 25 mg tablet Comments:   Reason for Stopping:               Referenced discharge instructions provided by nursing for diet and activity. Follow-up with Dr. Juan Power in 2 weeks.   Signed:  Júnior Kennedy MD  1/25/2017  10:08 AM

## 2018-03-04 ENCOUNTER — HOSPITAL ENCOUNTER (OUTPATIENT)
Age: 73
Setting detail: OBSERVATION
Discharge: HOME | End: 2018-03-05
Attending: EMERGENCY MEDICINE | Admitting: INTERNAL MEDICINE
Payer: MEDICARE

## 2018-03-04 ENCOUNTER — APPOINTMENT (OUTPATIENT)
Dept: GENERAL RADIOLOGY | Age: 73
End: 2018-03-04
Attending: PHYSICIAN ASSISTANT
Payer: MEDICARE

## 2018-03-04 ENCOUNTER — APPOINTMENT (OUTPATIENT)
Dept: CT IMAGING | Age: 73
End: 2018-03-04
Attending: NURSE PRACTITIONER
Payer: MEDICARE

## 2018-03-04 DIAGNOSIS — R55 NEAR SYNCOPE: Primary | ICD-10-CM

## 2018-03-04 DIAGNOSIS — R42 DIZZINESS: ICD-10-CM

## 2018-03-04 LAB
ALBUMIN SERPL-MCNC: 3.9 G/DL (ref 3.5–5)
ALBUMIN/GLOB SERPL: 1 {RATIO} (ref 1.1–2.2)
ALP SERPL-CCNC: 77 U/L (ref 45–117)
ALT SERPL-CCNC: 31 U/L (ref 12–78)
ANION GAP SERPL CALC-SCNC: 7 MMOL/L (ref 5–15)
APPEARANCE UR: CLEAR
AST SERPL-CCNC: 19 U/L (ref 15–37)
ATRIAL RATE: 77 BPM
ATRIAL RATE: 89 BPM
BACTERIA URNS QL MICRO: NEGATIVE /HPF
BASOPHILS # BLD: 0 K/UL (ref 0–0.1)
BASOPHILS NFR BLD: 0 % (ref 0–1)
BILIRUB SERPL-MCNC: 0.8 MG/DL (ref 0.2–1)
BILIRUB UR QL: NEGATIVE
BUN SERPL-MCNC: 16 MG/DL (ref 6–20)
BUN/CREAT SERPL: 14 (ref 12–20)
CALCIUM SERPL-MCNC: 8.6 MG/DL (ref 8.5–10.1)
CALCULATED P AXIS, ECG09: 42 DEGREES
CALCULATED R AXIS, ECG10: 41 DEGREES
CALCULATED R AXIS, ECG10: 44 DEGREES
CALCULATED T AXIS, ECG11: 29 DEGREES
CALCULATED T AXIS, ECG11: 32 DEGREES
CHLORIDE SERPL-SCNC: 107 MMOL/L (ref 97–108)
CO2 SERPL-SCNC: 28 MMOL/L (ref 21–32)
COLOR UR: ABNORMAL
CREAT SERPL-MCNC: 1.11 MG/DL (ref 0.7–1.3)
DIAGNOSIS, 93000: NORMAL
DIAGNOSIS, 93000: NORMAL
DIFFERENTIAL METHOD BLD: ABNORMAL
EOSINOPHIL # BLD: 0 K/UL (ref 0–0.4)
EOSINOPHIL NFR BLD: 0 % (ref 0–7)
EPITH CASTS URNS QL MICRO: ABNORMAL /LPF
ERYTHROCYTE [DISTWIDTH] IN BLOOD BY AUTOMATED COUNT: 13.3 % (ref 11.5–14.5)
GLOBULIN SER CALC-MCNC: 3.8 G/DL (ref 2–4)
GLUCOSE SERPL-MCNC: 122 MG/DL (ref 65–100)
GLUCOSE UR STRIP.AUTO-MCNC: NEGATIVE MG/DL
HCT VFR BLD AUTO: 40.3 % (ref 36.6–50.3)
HGB BLD-MCNC: 13.7 G/DL (ref 12.1–17)
HGB UR QL STRIP: NEGATIVE
HYALINE CASTS URNS QL MICRO: ABNORMAL /LPF (ref 0–5)
IMM GRANULOCYTES # BLD: 0 K/UL (ref 0–0.04)
IMM GRANULOCYTES NFR BLD AUTO: 0 % (ref 0–0.5)
KETONES UR QL STRIP.AUTO: NEGATIVE MG/DL
LACTATE SERPL-SCNC: 1.6 MMOL/L (ref 0.4–2)
LEUKOCYTE ESTERASE UR QL STRIP.AUTO: NEGATIVE
LYMPHOCYTES # BLD: 0.3 K/UL (ref 0.8–3.5)
LYMPHOCYTES NFR BLD: 3 % (ref 12–49)
MAGNESIUM SERPL-MCNC: 1.7 MG/DL (ref 1.6–2.4)
MCH RBC QN AUTO: 33.3 PG (ref 26–34)
MCHC RBC AUTO-ENTMCNC: 34 G/DL (ref 30–36.5)
MCV RBC AUTO: 97.8 FL (ref 80–99)
MONOCYTES # BLD: 0.6 K/UL (ref 0–1)
MONOCYTES NFR BLD: 6 % (ref 5–13)
NEUTS SEG # BLD: 9.7 K/UL (ref 1.8–8)
NEUTS SEG NFR BLD: 91 % (ref 32–75)
NITRITE UR QL STRIP.AUTO: NEGATIVE
NRBC # BLD: 0 K/UL (ref 0–0.01)
NRBC BLD-RTO: 0 PER 100 WBC
P-R INTERVAL, ECG05: 142 MS
P-R INTERVAL, ECG05: 174 MS
PH UR STRIP: 7 [PH] (ref 5–8)
PLATELET # BLD AUTO: 186 K/UL (ref 150–400)
PMV BLD AUTO: 10.3 FL (ref 8.9–12.9)
POTASSIUM SERPL-SCNC: 3.9 MMOL/L (ref 3.5–5.1)
PROT SERPL-MCNC: 7.7 G/DL (ref 6.4–8.2)
PROT UR STRIP-MCNC: 30 MG/DL
Q-T INTERVAL, ECG07: 442 MS
Q-T INTERVAL, ECG07: 464 MS
QRS DURATION, ECG06: 146 MS
QRS DURATION, ECG06: 150 MS
QTC CALCULATION (BEZET), ECG08: 525 MS
QTC CALCULATION (BEZET), ECG08: 537 MS
RBC # BLD AUTO: 4.12 M/UL (ref 4.1–5.7)
RBC #/AREA URNS HPF: ABNORMAL /HPF (ref 0–5)
RBC MORPH BLD: ABNORMAL
SODIUM SERPL-SCNC: 142 MMOL/L (ref 136–145)
SP GR UR REFRACTOMETRY: 1.02 (ref 1–1.03)
TROPONIN I SERPL-MCNC: <0.04 NG/ML
TROPONIN I SERPL-MCNC: <0.04 NG/ML
UR CULT HOLD, URHOLD: NORMAL
UROBILINOGEN UR QL STRIP.AUTO: 1 EU/DL (ref 0.2–1)
VENTRICULAR RATE, ECG03: 77 BPM
VENTRICULAR RATE, ECG03: 89 BPM
WBC # BLD AUTO: 10.6 K/UL (ref 4.1–11.1)
WBC URNS QL MICRO: ABNORMAL /HPF (ref 0–4)

## 2018-03-04 PROCEDURE — 71046 X-RAY EXAM CHEST 2 VIEWS: CPT

## 2018-03-04 PROCEDURE — 74011250637 HC RX REV CODE- 250/637: Performed by: NURSE PRACTITIONER

## 2018-03-04 PROCEDURE — 65270000029 HC RM PRIVATE

## 2018-03-04 PROCEDURE — 93005 ELECTROCARDIOGRAM TRACING: CPT

## 2018-03-04 PROCEDURE — 80053 COMPREHEN METABOLIC PANEL: CPT | Performed by: PHYSICIAN ASSISTANT

## 2018-03-04 PROCEDURE — 74011250637 HC RX REV CODE- 250/637: Performed by: INTERNAL MEDICINE

## 2018-03-04 PROCEDURE — 65390000012 HC CONDITION CODE 44 OBSERVATION

## 2018-03-04 PROCEDURE — 85025 COMPLETE CBC W/AUTO DIFF WBC: CPT | Performed by: PHYSICIAN ASSISTANT

## 2018-03-04 PROCEDURE — 96361 HYDRATE IV INFUSION ADD-ON: CPT

## 2018-03-04 PROCEDURE — 96365 THER/PROPH/DIAG IV INF INIT: CPT

## 2018-03-04 PROCEDURE — 74011250636 HC RX REV CODE- 250/636: Performed by: NURSE PRACTITIONER

## 2018-03-04 PROCEDURE — 70450 CT HEAD/BRAIN W/O DYE: CPT

## 2018-03-04 PROCEDURE — 81001 URINALYSIS AUTO W/SCOPE: CPT | Performed by: HOSPITALIST

## 2018-03-04 PROCEDURE — 84484 ASSAY OF TROPONIN QUANT: CPT | Performed by: PHYSICIAN ASSISTANT

## 2018-03-04 PROCEDURE — 83735 ASSAY OF MAGNESIUM: CPT | Performed by: NURSE PRACTITIONER

## 2018-03-04 PROCEDURE — 83605 ASSAY OF LACTIC ACID: CPT | Performed by: NURSE PRACTITIONER

## 2018-03-04 PROCEDURE — 99285 EMERGENCY DEPT VISIT HI MDM: CPT

## 2018-03-04 PROCEDURE — 74011250636 HC RX REV CODE- 250/636

## 2018-03-04 PROCEDURE — 36415 COLL VENOUS BLD VENIPUNCTURE: CPT | Performed by: NURSE PRACTITIONER

## 2018-03-04 PROCEDURE — 99218 HC RM OBSERVATION: CPT

## 2018-03-04 PROCEDURE — 96375 TX/PRO/DX INJ NEW DRUG ADDON: CPT

## 2018-03-04 RX ORDER — ACETAMINOPHEN 325 MG/1
975 TABLET ORAL
Status: COMPLETED | OUTPATIENT
Start: 2018-03-04 | End: 2018-03-04

## 2018-03-04 RX ORDER — ATORVASTATIN CALCIUM 40 MG/1
40 TABLET, FILM COATED ORAL
COMMUNITY
End: 2019-10-30 | Stop reason: SDUPTHER

## 2018-03-04 RX ORDER — CARVEDILOL 12.5 MG/1
12.5 TABLET ORAL 2 TIMES DAILY WITH MEALS
Status: DISCONTINUED | OUTPATIENT
Start: 2018-03-05 | End: 2018-03-05 | Stop reason: HOSPADM

## 2018-03-04 RX ORDER — ONDANSETRON 2 MG/ML
4 INJECTION INTRAMUSCULAR; INTRAVENOUS
Status: COMPLETED | OUTPATIENT
Start: 2018-03-04 | End: 2018-03-04

## 2018-03-04 RX ORDER — ONDANSETRON 4 MG/1
4 TABLET, ORALLY DISINTEGRATING ORAL
Status: DISCONTINUED | OUTPATIENT
Start: 2018-03-04 | End: 2018-03-04

## 2018-03-04 RX ORDER — ATORVASTATIN CALCIUM 40 MG/1
40 TABLET, FILM COATED ORAL DAILY
Status: DISCONTINUED | OUTPATIENT
Start: 2018-03-05 | End: 2018-03-05 | Stop reason: HOSPADM

## 2018-03-04 RX ORDER — ONDANSETRON 2 MG/ML
4 INJECTION INTRAMUSCULAR; INTRAVENOUS
Status: DISCONTINUED | OUTPATIENT
Start: 2018-03-04 | End: 2018-03-05 | Stop reason: HOSPADM

## 2018-03-04 RX ORDER — ZOLPIDEM TARTRATE 5 MG/1
5 TABLET ORAL
Status: DISCONTINUED | OUTPATIENT
Start: 2018-03-04 | End: 2018-03-05 | Stop reason: HOSPADM

## 2018-03-04 RX ORDER — ONDANSETRON 2 MG/ML
INJECTION INTRAMUSCULAR; INTRAVENOUS
Status: COMPLETED
Start: 2018-03-04 | End: 2018-03-04

## 2018-03-04 RX ORDER — ASPIRIN 81 MG/1
81 TABLET ORAL DAILY
Status: DISCONTINUED | OUTPATIENT
Start: 2018-03-05 | End: 2018-03-05 | Stop reason: HOSPADM

## 2018-03-04 RX ORDER — SODIUM CHLORIDE 0.9 % (FLUSH) 0.9 %
5-10 SYRINGE (ML) INJECTION EVERY 8 HOURS
Status: DISCONTINUED | OUTPATIENT
Start: 2018-03-04 | End: 2018-03-05 | Stop reason: HOSPADM

## 2018-03-04 RX ORDER — ENOXAPARIN SODIUM 100 MG/ML
40 INJECTION SUBCUTANEOUS EVERY 24 HOURS
Status: DISCONTINUED | OUTPATIENT
Start: 2018-03-05 | End: 2018-03-05 | Stop reason: HOSPADM

## 2018-03-04 RX ORDER — MAGNESIUM SULFATE HEPTAHYDRATE 40 MG/ML
2 INJECTION, SOLUTION INTRAVENOUS ONCE
Status: COMPLETED | OUTPATIENT
Start: 2018-03-04 | End: 2018-03-04

## 2018-03-04 RX ORDER — ACETAMINOPHEN 325 MG/1
650 TABLET ORAL
Status: COMPLETED | OUTPATIENT
Start: 2018-03-04 | End: 2018-03-04

## 2018-03-04 RX ORDER — PAROXETINE HYDROCHLORIDE 20 MG/1
30 TABLET, FILM COATED ORAL DAILY
Status: DISCONTINUED | OUTPATIENT
Start: 2018-03-05 | End: 2018-03-05 | Stop reason: HOSPADM

## 2018-03-04 RX ORDER — ASPIRIN 325 MG
325 TABLET ORAL ONCE
Status: DISCONTINUED | OUTPATIENT
Start: 2018-03-04 | End: 2018-03-04

## 2018-03-04 RX ORDER — SODIUM CHLORIDE 0.9 % (FLUSH) 0.9 %
5-10 SYRINGE (ML) INJECTION AS NEEDED
Status: DISCONTINUED | OUTPATIENT
Start: 2018-03-04 | End: 2018-03-05 | Stop reason: HOSPADM

## 2018-03-04 RX ORDER — AMIODARONE HYDROCHLORIDE 200 MG/1
100 TABLET ORAL DAILY
Status: DISCONTINUED | OUTPATIENT
Start: 2018-03-05 | End: 2018-03-05 | Stop reason: HOSPADM

## 2018-03-04 RX ADMIN — SODIUM CHLORIDE 1000 ML: 900 INJECTION, SOLUTION INTRAVENOUS at 15:09

## 2018-03-04 RX ADMIN — ONDANSETRON 4 MG: 2 INJECTION INTRAMUSCULAR; INTRAVENOUS at 15:01

## 2018-03-04 RX ADMIN — ACETAMINOPHEN 975 MG: 325 TABLET, FILM COATED ORAL at 14:59

## 2018-03-04 RX ADMIN — ACETAMINOPHEN 650 MG: 325 TABLET, FILM COATED ORAL at 23:54

## 2018-03-04 RX ADMIN — MAGNESIUM SULFATE HEPTAHYDRATE 2 G: 40 INJECTION, SOLUTION INTRAVENOUS at 16:11

## 2018-03-04 NOTE — IP AVS SNAPSHOT
[address] 
[phone] Patient: Steve Hackett MRN: [id number] XBI:[birth year] A check kourtney indicates which time of day the medication should be taken. My Medications CONTINUE taking these medications Instructions Each Dose to Equal  
 Morning Noon Evening Bedtime  
 amiodarone 200 mg tablet Commonly known as:  CORDARONE Your last dose was: Your next dose is: Take 100 mg by mouth daily. 100 mg  
    
   
   
   
  
 aspirin delayed-release 81 mg tablet Your last dose was: Your next dose is: Take 81 mg by mouth daily. 81 mg  
    
   
   
   
  
 atorvastatin 40 mg tablet Commonly known as:  LIPITOR Your last dose was: Your next dose is: Take 40 mg by mouth daily. 40 mg  
    
   
   
   
  
 carvedilol 12.5 mg tablet Commonly known as:  Drena Gavel Your last dose was: Your next dose is: Take 1 Tab by mouth two (2) times daily (with meals). 12.5 mg PARoxetine 40 mg tablet Commonly known as:  PAXIL Your last dose was: Your next dose is: Take 30 mg by mouth daily. 30 mg  
    
   
   
   
  
  
STOP taking these medications   
 losartan 50 mg tablet Commonly known as:  COZAAR

## 2018-03-04 NOTE — PROGRESS NOTES
Admission Medication Reconciliation:    Information obtained from: Wife, Umang Macias, chart    Significant PMH/Disease States:   Past Medical History:   Diagnosis Date    BPH (benign prostatic hyperplasia)     s/p TURP    CAD (coronary artery disease)     stent to cx 2014    Cancer ([facility] [address] 1985    superficial spreading melanoma    GERD (gastroesophageal reflux disease)     Hypertension     Psychiatric disorder 1990's, 2014    situational depression       Chief Complaint for this Admission:  Dizziness, chest pain, cough    Allergies:  Codeine and Penicillins    Prior to Admission Medications:   Prior to Admission Medications   Prescriptions Last Dose Informant Patient Reported? Taking? PARoxetine (PAXIL) 40 mg tablet 3/3/2018 at Unknown time  Yes Yes   Sig: Take 30 mg by mouth daily. amiodarone (CORDARONE) 200 mg tablet 3/3/2018 at Unknown time  Yes Yes   Sig: Take 100 mg by mouth daily. aspirin delayed-release 81 mg tablet 3/3/2018 at Unknown time  Yes Yes   Sig: Take 81 mg by mouth daily. atorvastatin (LIPITOR) 40 mg tablet 3/3/2018 at Unknown time  Yes Yes   Sig: Take 40 mg by mouth daily. carvedilol (COREG) 12.5 mg tablet 3/3/2018 at Unknown time  No Yes   Sig: Take 1 Tab by mouth two (2) times daily (with meals). losartan (COZAAR) 50 mg tablet 3/3/2018 at Unknown time  No Yes   Sig: Take 1 Tab by mouth daily. Patient taking differently: Take 25 mg by mouth daily. Facility-Administered Medications: None         Comments/Recommendations: Patient was MARION for procedure, wife provided information and administration times. Updated med list provided. Uses [employer]. Revised:  1. Paroxetine to 30 mg  2. Amiodarone to 100 mg    Added:  1. Atorvastatin (replaces simvastatin)    Deleted:  1. Simvastatin  2. Lactobacillus    Thank you for allowing me to participate in the care of your patient.     Rodolfo DickinsonD, RN #[id number]

## 2018-03-04 NOTE — ED TRIAGE NOTES

## 2018-03-04 NOTE — IP AVS SNAPSHOT
[address] [facility] [address] 
[phone] Patient: Sunday Luis Felipe MRN: [id number]:[id number] About your hospitalization You were admitted on:  March 4, 2018 You last received care in the:  [facility] 5 OBSERVATION You were discharged on:  March 5, 2018 Why you were hospitalized Your primary diagnosis was:  Dizziness Your diagnoses also included:  Ashd (Arteriosclerotic Heart Disease), Ventricular Premature Beats, Vt (Ventricular Tachycardia) (Hcc), Hypertension, Benign, Other Headache Syndrome Follow-up Information Follow up With Details Comments Contact Info Latrice Vaughan MD Schedule an appointment as soon as possible for a visit in 1 week  [address] 
[phone] Your Scheduled Appointments Monday April 30, 2018  9:30 AM EDT  
ESTABLISHED PATIENT with MD Ilene Barnett TURNER, [address] [place] [address] [address]  
[phone] Discharge Orders None A check kourtney indicates which time of day the medication should be taken. My Medications CONTINUE taking these medications Instructions Each Dose to Equal  
 Morning Noon Evening Bedtime  
 amiodarone 200 mg tablet Commonly known as:  CORDARONE Your last dose was: Your next dose is: Take 100 mg by mouth daily. 100 mg  
    
   
   
   
  
 aspirin delayed-release 81 mg tablet Your last dose was: Your next dose is: Take 81 mg by mouth daily. 81 mg  
    
   
   
   
  
 atorvastatin 40 mg tablet Commonly known as:  LIPITOR Your last dose was: Your next dose is: Take 40 mg by mouth daily. 40 mg  
    
   
   
   
  
 carvedilol 12.5 mg tablet Commonly known as:  Kingman Dine Your last dose was: Your next dose is: Take 1 Tab by mouth two (2) times daily (with meals). 12.5 mg PARoxetine 40 mg tablet Commonly known as:  PAXIL Your last dose was: Your next dose is: Take 30 mg by mouth daily. 30 mg  
    
   
   
   
  
  
STOP taking these medications   
 losartan 50 mg tablet Commonly known as:  COZAAR Discharge Instructions Patient Discharge Instructions Colleen Van / [id number] : 1945 Admitted 3/4/2018 Discharged: 3/5/2018 Take Home Medications · It is important that you take the medication exactly as they are prescribed. · Keep your medication in the bottles provided by the pharmacist and keep a list of the medication names, dosages, and times to be taken in your wallet. · Do not take other medications without consulting your doctor. What to do at [facility] Recommended diet: Cardiac Diet. Recommended activity: Activity as tolerated. Follow-up with Dr. Samina Chu in 1 week. Information obtained by : 
I understand that if any problems occur once I am at home I am to contact my physician. I understand and acknowledge receipt of the instructions indicated above. Physician's or R.N.'s Signature                                                                  Date/Time Patient or Representative Signature                                                          Date/Time Introducing [facility]! Dear Vanessa Cunha: 
Thank you for requesting a Zo[employer]u account. Our records indicate that you already have an active Zoombu account. You can access your account anytime at [email or web address] Did you know that you can access your hospital and ER discharge instructions at any time in Win[employer]? You can also review all of your test results from your hospital stay or ER visit. Additional Information If you have questions, please visit the Frequently Asked Questions section of the [employer] website at [email or web address] Remember, WinD[employer] is NOT to be used for urgent needs. For medical emergencies, dial 911. Now available from your iPhone and Android! Providers Seen During Your Hospitalization Provider Specialty Primary office phone Char Stanton MD Emergency Medicine [phone] Jefferson Donaldson MD Internal Medicine [phone] Your Primary Care Physician (PCP) Primary Care Physician Office Phone Office Fax Melissa Childs [phone] You are allergic to the following Allergen Reactions Codeine Nausea and Vomiting Penicillins Rash As a child. Recent Documentation Height Weight BMI Smoking Status 1.829 m 84.8 kg 25.36 kg/m2 Former Smoker Emergency Contacts Name Discharge Info Relation Home Work Mobile Lisa Castellon DISCHARGE CAREGIVER [3] Spouse [3]   [phone] BisiMiguel A schulte DISCHARGE CAREGIVER [3] Son [22]   [phone] Patient Belongings The following personal items are in your possession at time of discharge: 
                             
 
  
  
 Please provide this summary of care documentation to your next provider. Signatures-by signing, you are acknowledging that this After Visit Summary has been reviewed with you and you have received a copy. Patient Signature:  ____________________________________________________________ Date:  ____________________________________________________________  
  
Edel Doe Provider Signature:  ____________________________________________________________ Date:  ____________________________________________________________

## 2018-03-04 NOTE — ED NOTES
While standing for x-ray, per x-ray tech, pt complained of nausea and vomited. Pt returned to waiting room after x-ray sitting again in wheelchair and states that he feels ok. Pt appears pale and clammy.

## 2018-03-04 NOTE — ED TRIAGE NOTES
Pt reports dizziness that began this morning with some nausea but denies vomiting. Pt also reports having cough with nonradiating chest pain that began last night.

## 2018-03-04 NOTE — Clinical Note
Status[de-identified] Inpatient [101] Type of Bed: Clinical Observation Unit [36] Inpatient Hospitalization Certified Necessary for the Following Reasons: 9. Other (further clarification in H&P documentation) Admitting Diagnosis: Dizziness [626938] Admitting Physician: [address] [place] [address], Doctor Ashley 91 Attending Physician: Yanely Ty [phone] Estimated Length of Stay: 2 Midnights Discharge Plan[de-identified] Home with Office Follow-up

## 2018-03-04 NOTE — ED PROVIDER NOTES
HPI Comments: The patient is a 57-year-old male who presents to the emergency room with complaints of lightheadedness, global weakness, nausea, vomiting, chest pain, and cough. History of V. tach with AICD in place. He denies discharge from his AICD. He has had similar symptoms to his presenting ones in the past related to sustained vt. He has a normal EF by echocardiogram.  Symptoms began approximately 2 days ago. She reported severe lightheadedness with nausea and vomiting over last 48 hours. He has developed chest pain with intermittent cough since yesterday. He denies orthopnea or PND. No weight gain. No diarrhea. He does report mild abdominal cramping that began after vomiting. He is followed by Dr. Iesha Pickens for cardiology. He was last seen by his primary care physician 5 days ago it was having complications from intermittent dizziness which was attributed to his antihypertensive agents. He is supposed to be having eye surgery and needs a lower blood pressure. His losartan was reduced by half due to his symptoms. Prior to arriving at the emergency room he was seen in the local urgent care with low blood pressure and systolics in the 74X. Pt denies fevers, chills, night sweats, SOB, NEUMANN, PND, orthopnea, abdominal pain, n/v/d, melena, hematuria, dysuria, constipation, and syncope.        Past Medical History:  No date: BPH (benign prostatic hyperplasia)      Comment: s/p TURP  No date: CAD (coronary artery disease)      Comment: stent to cx 2014  1985: Cancer ([facility] [address]      Comment: superficial spreading melanoma  No date: GERD (gastroesophageal reflux disease)  No date: Hypertension  1990's, 2014: Psychiatric disorder      Comment: situational depression    Past Surgical History:  06/17/2015: CARDIAC SURG PROCEDURE UNLIST      Comment: BHUPINDER stent  No date: HX COLONOSCOPY  No date: HX IMPLANTABLE CARDIOVERTER DEFIBRILLATOR  No date: HX ORTHOPAEDIC      Comment: C5/C6 fusion, bone spur  2013: HX PROSTATECTOMY Comment: TURP  No date: HX TURP    PCP:  Latrice Vaughan MD      Patient is a 74 y.o. male presenting with dizziness, chest pain, and cough. The history is provided by the patient. Dizziness   Pertinent negatives include no agitation. Associated symptoms include chest pain, vomiting, headaches and nausea. Pertinent negatives include no shortness of breath, no confusion and no choking. Chest Pain (Angina)    Associated symptoms include cough, diaphoresis, dizziness, headaches, nausea, vomiting and weakness. Pertinent negatives include no abdominal pain, no back pain, no fever, no numbness, no palpitations and no shortness of breath. Cough   Associated symptoms include chest pain, headaches, nausea and vomiting. Pertinent negatives include no chills, no eye redness, no ear pain, no rhinorrhea, no sore throat, no myalgias, no shortness of breath, no wheezing and no confusion.         Past Medical History:   Diagnosis Date    BPH (benign prostatic hyperplasia)     s/p TURP    CAD (coronary artery disease)     stent to cx 2014    Cancer ([facility] [address] 1985    superficial spreading melanoma    GERD (gastroesophageal reflux disease)     Hypertension     Psychiatric disorder 1990's, 2014    situational depression       Past Surgical History:   Procedure Laterality Date    CARDIAC SURG PROCEDURE UNLIST  06/17/2015    BHUPINDER stent    HX COLONOSCOPY      HX IMPLANTABLE CARDIOVERTER DEFIBRILLATOR      HX ORTHOPAEDIC      C5/C6 fusion, bone spur    HX PROSTATECTOMY  2013    TURP    HX TURP           Family History:   Problem Relation Age of Onset    Hypertension Mother     Psychiatric Disorder Mother     Heart Disease Father 80   Filbert Free Elevated Lipids Sister     Psychiatric Disorder Sister     Elevated Lipids Brother     Psychiatric Disorder Brother        Social History     Social History    Marital status:      Spouse name: N/A    Number of children: N/A    Years of education: N/A     Occupational History    Not on file. Social History Main Topics    Smoking status: Former Smoker     Quit date: 7/7/2014    Smokeless tobacco: Former User     Quit date: 7/7/2010    Alcohol use 4.8 oz/week     8 Shots of liquor per week      Comment: vodka or bourbon    Drug use: No    Sexual activity: Not on file     Other Topics Concern    Not on file     Social History Narrative         ALLERGIES: Codeine and Penicillins    Review of Systems   Constitutional: Positive for diaphoresis. Negative for activity change, appetite change, chills, fatigue, fever and unexpected weight change. HENT: Negative for congestion, ear pain, rhinorrhea, sinus pressure, sore throat, tinnitus, trouble swallowing and voice change. Eyes: Negative for pain, discharge, redness and visual disturbance. Respiratory: Positive for cough. Negative for apnea, choking, chest tightness, shortness of breath, wheezing and stridor. Cardiovascular: Positive for chest pain. Negative for palpitations and leg swelling. Gastrointestinal: Positive for nausea and vomiting. Negative for abdominal pain and constipation. Endocrine: Negative for cold intolerance and heat intolerance. Genitourinary: Negative for difficulty urinating, dysuria, flank pain, hematuria, testicular pain and urgency. Musculoskeletal: Positive for joint swelling. Negative for arthralgias, back pain, gait problem, myalgias, neck pain and neck stiffness. Skin: Negative for color change, pallor, rash and wound. Allergic/Immunologic: Negative for immunocompromised state. Neurological: Positive for dizziness, weakness, light-headedness and headaches. Negative for tremors, syncope and numbness. Hematological: Does not bruise/bleed easily. Psychiatric/Behavioral: Negative for agitation, confusion and suicidal ideas.        Vitals:    03/04/18 1212 03/04/18 1420   BP: 121/80    Pulse: 87    Resp: 16    Temp: 99.1 °F (37.3 °C)    SpO2: 95% 98%   Weight: 84.8 kg (187 lb) Height: 6' (1.829 m)             Physical Exam   Constitutional: He is oriented to person, place, and time. He appears well-developed and well-nourished. No distress. HENT:   Head: Atraumatic. Nose: Nose normal.   Mouth/Throat: No oropharyngeal exudate. Eyes: Conjunctivae and EOM are normal. Right eye exhibits no discharge. Left eye exhibits no discharge. No scleral icterus. Neck: Normal range of motion. Neck supple. No JVD present. No tracheal deviation present. No thyromegaly present. Cardiovascular: Normal rate and regular rhythm. Exam reveals no gallop and no friction rub. No murmur heard. Pulmonary/Chest: Breath sounds normal. No stridor. No respiratory distress. He has no wheezes. He has no rales. He exhibits no tenderness. Abdominal: Soft. Bowel sounds are normal. He exhibits no distension and no mass. There is no tenderness. There is no rebound and no guarding. Musculoskeletal: Normal range of motion. He exhibits no edema or tenderness. Lymphadenopathy:     He has no cervical adenopathy. Neurological: He is alert and oriented to person, place, and time. Coordination normal.   Skin: Skin is warm and dry. He is not diaphoretic. Psychiatric: He has a normal mood and affect. His behavior is normal.   Nursing note and vitals reviewed.        MDM  Number of Diagnoses or Management Options  Diagnosis management comments:    * routine laboratory data and UA   * Interrogate AICD   * IVF   * EKG   * Tylenol   * Orthostatic BP   * Consult to Cards       Amount and/or Complexity of Data Reviewed  Clinical lab tests: ordered and reviewed  Tests in the radiology section of CPT®: ordered and reviewed  Discussion of test results with the performing providers: yes  Review and summarize past medical records: yes  Discuss the patient with other providers: yes    Risk of Complications, Morbidity, and/or Mortality  General comments:    - hemodynamically labile but in NAD presently     Patient Progress  Patient progress: stable        ED Course       Procedures        CONSULT NOTE:   6:26 PM  Blaire Flores NP spoke with Dr. Margaret Orta MD,   Specialty: Internal Medicine  Discussed pt's hx, disposition, and available diagnostic and imaging results. Reviewed care plans. Consultant agrees with plans as outlined. Admit to inpatient. Blaire Flores NP        6:26 PM  Patient is being admitted to the hospital.  The results of their tests and reasons for their admission have been discussed with them and/or available family. They convey agreement and understanding for the need to be admitted and for their admission diagnosis. Consultation has been made with the inpatient physician specialist for hospitalization. LABORATORY TESTS:  Recent Results (from the past 12 hour(s))   CBC WITH AUTOMATED DIFF    Collection Time: 03/04/18 12:27 PM   Result Value Ref Range    WBC 10.6 4.1 - 11.1 K/uL    RBC 4.12 4.10 - 5.70 M/uL    HGB 13.7 12.1 - 17.0 g/dL    HCT 40.3 36.6 - 50.3 %    MCV 97.8 80.0 - 99.0 FL    MCH 33.3 26.0 - 34.0 PG    MCHC 34.0 30.0 - 36.5 g/dL    RDW 13.3 11.5 - 14.5 %    PLATELET 610 355 - 951 K/uL    MPV 10.3 8.9 - 12.9 FL    NRBC 0.0 0  WBC    ABSOLUTE NRBC 0.00 0.00 - 0.01 K/uL    NEUTROPHILS 91 (H) 32 - 75 %    LYMPHOCYTES 3 (L) 12 - 49 %    MONOCYTES 6 5 - 13 %    EOSINOPHILS 0 0 - 7 %    BASOPHILS 0 0 - 1 %    IMMATURE GRANULOCYTES 0 0.0 - 0.5 %    ABS. NEUTROPHILS 9.7 (H) 1.8 - 8.0 K/UL    ABS. LYMPHOCYTES 0.3 (L) 0.8 - 3.5 K/UL    ABS. MONOCYTES 0.6 0.0 - 1.0 K/UL    ABS. EOSINOPHILS 0.0 0.0 - 0.4 K/UL    ABS. BASOPHILS 0.0 0.0 - 0.1 K/UL    ABS. IMM.  GRANS. 0.0 0.00 - 0.04 K/UL    DF SMEAR SCANNED      RBC COMMENTS STOMATOCYTES  1+       METABOLIC PANEL, COMPREHENSIVE    Collection Time: 03/04/18 12:27 PM   Result Value Ref Range    Sodium 142 136 - 145 mmol/L    Potassium 3.9 3.5 - 5.1 mmol/L    Chloride 107 97 - 108 mmol/L    CO2 28 21 - 32 mmol/L    Anion gap 7 5 - 15 mmol/L    Glucose 122 (H) 65 - 100 mg/dL    BUN 16 6 - 20 MG/DL    Creatinine 1.11 0.70 - 1.30 MG/DL    BUN/Creatinine ratio 14 12 - 20      GFR est AA >60 >60 ml/min/1.73m2    GFR est non-AA >60 >60 ml/min/1.73m2    Calcium 8.6 8.5 - 10.1 MG/DL    Bilirubin, total 0.8 0.2 - 1.0 MG/DL    ALT (SGPT) 31 12 - 78 U/L    AST (SGOT) 19 15 - 37 U/L    Alk.  phosphatase 77 45 - 117 U/L    Protein, total 7.7 6.4 - 8.2 g/dL    Albumin 3.9 3.5 - 5.0 g/dL    Globulin 3.8 2.0 - 4.0 g/dL    A-G Ratio 1.0 (L) 1.1 - 2.2     TROPONIN I    Collection Time: 03/04/18 12:27 PM   Result Value Ref Range    Troponin-I, Qt. <0.04 <0.05 ng/mL   MAGNESIUM    Collection Time: 03/04/18 12:27 PM   Result Value Ref Range    Magnesium 1.7 1.6 - 2.4 mg/dL   EKG, 12 LEAD, INITIAL    Collection Time: 03/04/18  1:05 PM   Result Value Ref Range    Ventricular Rate 89 BPM    Atrial Rate 89 BPM    P-R Interval 142 ms    QRS Duration 146 ms    Q-T Interval 442 ms    QTC Calculation (Bezet) 537 ms    Calculated R Axis 41 degrees    Calculated T Axis 29 degrees    Diagnosis       Sinus rhythm with occasional premature ventricular complexes  Right bundle branch block  When compared with ECG of 23-JAN-2017 11:25,  Right bundle branch block is now present     EKG, 12 LEAD, SUBSEQUENT    Collection Time: 03/04/18  1:59 PM   Result Value Ref Range    Ventricular Rate 77 BPM    Atrial Rate 77 BPM    P-R Interval 174 ms    QRS Duration 150 ms    Q-T Interval 464 ms    QTC Calculation (Bezet) 525 ms    Calculated P Axis 42 degrees    Calculated R Axis 44 degrees    Calculated T Axis 32 degrees    Diagnosis       Normal sinus rhythm  Right bundle branch block  When compared with ECG of 04-MAR-2018 13:05,  MANUAL COMPARISON REQUIRED, DATA IS UNCONFIRMED     LACTIC ACID    Collection Time: 03/04/18  3:13 PM   Result Value Ref Range    Lactic acid 1.6 0.4 - 2.0 MMOL/L   TROPONIN I    Collection Time: 03/04/18  5:33 PM   Result Value Ref Range    Troponin-I, Qt. <0.04 <0.05 ng/mL       IMAGING RESULTS:  CT HEAD WO CONT   Final Result      XR CHEST PA LAT   Final Result        Xr Chest Pa Lat    Result Date: 3/4/2018  Exam:  2 view chest Indication: Nausea, dizziness, chest pain Comparison to 7/8/2016. PA and lateral views demonstrate normal heart size. Pacer leads are unchanged in position. There is no acute process in the lung fields. Degenerative changes are seen in the thoracic spine. Impression: No acute process or change compared to the prior exam.     Ct Head Wo Cont    Result Date: 3/4/2018  EXAM:  CT HEAD WO CONT INDICATION:   dizziness, nausea, vomiting COMPARISON: 1/23/2017. CONTRAST:  None. TECHNIQUE: Unenhanced CT of the head was performed using 5 mm images. Brain and bone windows were generated. CT dose reduction was achieved through use of a standardized protocol tailored for this examination and automatic exposure control for dose modulation. FINDINGS: The ventricles and sulci are normal in size, shape and configuration and midline. Minimal small vessel ischemic changes are stable compared to the prior exam. There is no intracranial hemorrhage, extra-axial collection, mass, mass effect or midline shift. The basilar cisterns are open. No acute infarct is identified. The bone windows demonstrate no abnormalities. The visualized portions of the paranasal sinuses and mastoid air cells are clear. And vascular calcification is noted. IMPRESSION: No acute process or change compared to the prior exam.       MEDICATIONS GIVEN:  Medications   sodium chloride 0.9 % bolus infusion 1,000 mL (0 mL IntraVENous IV Completed 3/4/18 1609)   acetaminophen (TYLENOL) tablet 975 mg (975 mg Oral Given 3/4/18 1459)   ondansetron (ZOFRAN) injection 4 mg (4 mg IntraVENous Given 3/4/18 1501)   magnesium sulfate 2 g/50 ml IVPB (premix or compounded) (2 g IntraVENous New Bag 3/4/18 1611)       IMPRESSION:  1. Near syncope        PLAN:  1.  Admit to Internal Medicine    [facility] Ana Rosario NP  6:26 PM

## 2018-03-05 VITALS
SYSTOLIC BLOOD PRESSURE: 148 MMHG | OXYGEN SATURATION: 94 % | WEIGHT: 187 LBS | RESPIRATION RATE: 20 BRPM | DIASTOLIC BLOOD PRESSURE: 84 MMHG | BODY MASS INDEX: 25.33 KG/M2 | HEIGHT: 72 IN | HEART RATE: 64 BPM | TEMPERATURE: 99.1 F

## 2018-03-05 PROBLEM — G44.89 OTHER HEADACHE SYNDROME: Status: ACTIVE | Noted: 2018-03-05

## 2018-03-05 LAB
ALBUMIN SERPL-MCNC: 3.2 G/DL (ref 3.5–5)
ALBUMIN/GLOB SERPL: 1 {RATIO} (ref 1.1–2.2)
ALP SERPL-CCNC: 59 U/L (ref 45–117)
ALT SERPL-CCNC: 29 U/L (ref 12–78)
ANION GAP SERPL CALC-SCNC: 6 MMOL/L (ref 5–15)
AST SERPL-CCNC: 20 U/L (ref 15–37)
BILIRUB SERPL-MCNC: 0.5 MG/DL (ref 0.2–1)
BUN SERPL-MCNC: 20 MG/DL (ref 6–20)
BUN/CREAT SERPL: 19 (ref 12–20)
CALCIUM SERPL-MCNC: 8 MG/DL (ref 8.5–10.1)
CHLORIDE SERPL-SCNC: 105 MMOL/L (ref 97–108)
CO2 SERPL-SCNC: 28 MMOL/L (ref 21–32)
CREAT SERPL-MCNC: 1.03 MG/DL (ref 0.7–1.3)
ERYTHROCYTE [DISTWIDTH] IN BLOOD BY AUTOMATED COUNT: 13.4 % (ref 11.5–14.5)
ERYTHROCYTE [SEDIMENTATION RATE] IN BLOOD: 11 MM/HR (ref 0–20)
FERRITIN SERPL-MCNC: 138 NG/ML (ref 26–388)
GLOBULIN SER CALC-MCNC: 3.3 G/DL (ref 2–4)
GLUCOSE SERPL-MCNC: 98 MG/DL (ref 65–100)
HCT VFR BLD AUTO: 35.1 % (ref 36.6–50.3)
HGB BLD-MCNC: 12 G/DL (ref 12.1–17)
IRON SATN MFR SERPL: 7 % (ref 20–50)
IRON SERPL-MCNC: 17 UG/DL (ref 35–150)
MCH RBC QN AUTO: 33.6 PG (ref 26–34)
MCHC RBC AUTO-ENTMCNC: 34.2 G/DL (ref 30–36.5)
MCV RBC AUTO: 98.3 FL (ref 80–99)
NRBC # BLD: 0 K/UL (ref 0–0.01)
NRBC BLD-RTO: 0 PER 100 WBC
PLATELET # BLD AUTO: 154 K/UL (ref 150–400)
PMV BLD AUTO: 10.4 FL (ref 8.9–12.9)
POTASSIUM SERPL-SCNC: 3.5 MMOL/L (ref 3.5–5.1)
PROT SERPL-MCNC: 6.5 G/DL (ref 6.4–8.2)
RBC # BLD AUTO: 3.57 M/UL (ref 4.1–5.7)
SODIUM SERPL-SCNC: 139 MMOL/L (ref 136–145)
TIBC SERPL-MCNC: 249 UG/DL (ref 250–450)
WBC # BLD AUTO: 7.4 K/UL (ref 4.1–11.1)

## 2018-03-05 PROCEDURE — 82728 ASSAY OF FERRITIN: CPT | Performed by: INTERNAL MEDICINE

## 2018-03-05 PROCEDURE — 80053 COMPREHEN METABOLIC PANEL: CPT | Performed by: INTERNAL MEDICINE

## 2018-03-05 PROCEDURE — 74011250636 HC RX REV CODE- 250/636: Performed by: INTERNAL MEDICINE

## 2018-03-05 PROCEDURE — 99218 HC RM OBSERVATION: CPT

## 2018-03-05 PROCEDURE — 96372 THER/PROPH/DIAG INJ SC/IM: CPT

## 2018-03-05 PROCEDURE — 74011250637 HC RX REV CODE- 250/637: Performed by: INTERNAL MEDICINE

## 2018-03-05 PROCEDURE — 36415 COLL VENOUS BLD VENIPUNCTURE: CPT | Performed by: INTERNAL MEDICINE

## 2018-03-05 PROCEDURE — 83540 ASSAY OF IRON: CPT | Performed by: INTERNAL MEDICINE

## 2018-03-05 PROCEDURE — 85652 RBC SED RATE AUTOMATED: CPT | Performed by: INTERNAL MEDICINE

## 2018-03-05 PROCEDURE — 85027 COMPLETE CBC AUTOMATED: CPT | Performed by: INTERNAL MEDICINE

## 2018-03-05 RX ORDER — MECLIZINE HCL 12.5 MG 12.5 MG/1
25 TABLET ORAL
Status: DISCONTINUED | OUTPATIENT
Start: 2018-03-05 | End: 2018-03-05 | Stop reason: HOSPADM

## 2018-03-05 RX ORDER — ACETAMINOPHEN 325 MG/1
650 TABLET ORAL
Status: DISCONTINUED | OUTPATIENT
Start: 2018-03-05 | End: 2018-03-05 | Stop reason: HOSPADM

## 2018-03-05 RX ORDER — IBUPROFEN 400 MG/1
400 TABLET ORAL
Status: DISCONTINUED | OUTPATIENT
Start: 2018-03-05 | End: 2018-03-05 | Stop reason: HOSPADM

## 2018-03-05 RX ADMIN — Medication 5 ML: at 14:00

## 2018-03-05 RX ADMIN — CARVEDILOL 12.5 MG: 12.5 TABLET, FILM COATED ORAL at 08:03

## 2018-03-05 RX ADMIN — ENOXAPARIN SODIUM 40 MG: 40 INJECTION SUBCUTANEOUS at 09:52

## 2018-03-05 RX ADMIN — PAROXETINE 30 MG: 20 TABLET, FILM COATED ORAL at 09:51

## 2018-03-05 RX ADMIN — CARVEDILOL 12.5 MG: 12.5 TABLET, FILM COATED ORAL at 17:21

## 2018-03-05 RX ADMIN — IBUPROFEN 400 MG: 400 TABLET, FILM COATED ORAL at 08:03

## 2018-03-05 RX ADMIN — MECLIZINE 25 MG: 12.5 TABLET ORAL at 08:03

## 2018-03-05 RX ADMIN — ASPIRIN 81 MG: 81 TABLET, COATED ORAL at 09:52

## 2018-03-05 RX ADMIN — ATORVASTATIN CALCIUM 40 MG: 40 TABLET, FILM COATED ORAL at 09:51

## 2018-03-05 RX ADMIN — AMIODARONE HYDROCHLORIDE 100 MG: 200 TABLET ORAL at 09:51

## 2018-03-05 NOTE — ROUTINE PROCESS
TRANSFER - OUT REPORT:    Verbal report given to RN(name) on Lety Ann  being transferred to OBS(unit) for routine progression of care       Report consisted of patients Situation, Background, Assessment and   Recommendations(SBAR). Information from the following report(s) SBAR, ED Summary and MAR was reviewed with the receiving nurse. Lines:   Peripheral IV 03/04/18 Right Antecubital (Active)   Site Assessment Clean, dry, & intact 3/4/2018 12:32 PM   Phlebitis Assessment 0 3/4/2018 12:32 PM   Infiltration Assessment 0 3/4/2018 12:32 PM   Dressing Status Clean, dry, & intact 3/4/2018 12:32 PM        Opportunity for questions and clarification was provided.       Visit Vitals    /71    Pulse 82    Temp 99.1 °F (37.3 °C)    Resp 20    Ht 6' (1.829 m)    Wt 84.8 kg (187 lb)    SpO2 90%    BMI 25.36 kg/m2

## 2018-03-05 NOTE — PROGRESS NOTES
Bedside and Verbal shift change report given to Carmen Bush (oncoming nurse) by rEica Ricci (offgoing nurse). Report included the following information SBAR, Kardex and ED Summary.

## 2018-03-05 NOTE — PROGRESS NOTES
Appreciate Dr. Nicolás Ndiaye' input. Pt not orthostatic. Unfortunately, unable to do MRI b/c one of the AICD leads is not MRI-compatible. Discharge home off of Losartan. F/u with me 1 week.

## 2018-03-05 NOTE — CONSULTS
Cardiology Consult Note    Pt seen and examined. His last office note of 9/5/17 is on chart    Imp:  Viral illness with cough, chills and fever, headache, elevated WBC, etc  Chronic dizziness related to getting up from seated position, but does not happen immediately. HTN  CAD s/p BHUPINDER to OM 2015  VT with ablation and ICD implantation  Hyperlipidemia  BPH  GERD    Rec:  Con't to hold losartan; push fluids  MRI of head ordered-device is MRI compatible; call to rep placed  Orthostatic BP and P measurement    Blood pressure 121/65, pulse 61, temperature 97.9 °F (36.6 °C), resp. rate 20, height 6' (1.829 m), weight 84.8 kg (187 lb), SpO2 91 %. No JVD  No carotid bruits  Lungs with crackles left anterior chest  Cor reg with no M, G  ICD site fine  Abd soft  Ext without edema and intact pulses  Neuro alert and oriented.  Normal strength and romberg    Labs reviewed  EKG with NSR and RBBB  CXR without infiltrate    Mouna Hopkins MD

## 2018-03-05 NOTE — ED NOTES
Verbal shift change report given to [facility] (oncoming nurse) by Anahi Lindo (offgoing nurse). Report included the following information SBAR, Intake/Output, MAR and Recent Results.

## 2018-03-05 NOTE — PROGRESS NOTES
Neurology input appreciated. They feel pt's sx's may be cardiac in origin. .... Therefore, will ask Dr. Nettie Ferris, pt's cardiologist, to see pt.

## 2018-03-05 NOTE — ED NOTES
Pacemaker interrogation by Rosi Jensen RN. Information transmitted to [address] [place] [address].

## 2018-03-05 NOTE — CONSULTS
NEUROLOGY NEW PATIENT CONSULTATION    CONSULTED BY:  Leila Moreno MD  NAME: Jillian Malik   :  1945   MRN:  [id number]   DATE:  3/5/2018    Chief Complaint   Patient presents with   Moo Acon Dizziness    Chest Pain    Cough        HISTORY OF PRESENT ILLNESS    HISTORY PROVIDED BY:  Patient  Chart review      Jillian Malik is a 74 y.o. male who I am asked to see in consultation for dizziness    Onset 6 months ago. Described as lightheadedness. Happen predominantly when he gets up from a lying down position. Improves on staying still and relaxing. When this happens his gait becomes wobbly. One episode of \"spinning sensation\" recently. Yesterday also had some chest pain associated with this and found to have PVCs. Is admitted for cardiac evaluation. MRI Brain has also been ordered. Has chronic bilateral ptosis and states that this is being addressed as outpatient.  He denies any focal weakness, numbness, facial droop, slurred speech,double vision        PMH  Past Medical History:   Diagnosis Date    BPH (benign prostatic hyperplasia)     s/p TURP    CAD (coronary artery disease)     stent to cx     Cancer ([facility] [address] 1985    superficial spreading melanoma    GERD (gastroesophageal reflux disease)     Hypertension     Psychiatric disorder 's,     situational depression     Past Surgical History:   Procedure Laterality Date    CARDIAC SURG PROCEDURE UNLIST  2015    BHUPINDER stent    HX COLONOSCOPY      HX IMPLANTABLE CARDIOVERTER DEFIBRILLATOR      HX ORTHOPAEDIC      C5/C6 fusion, bone spur    HX PROSTATECTOMY  2013    TURP    HX TURP         SH  Social History     Social History    Marital status:      Spouse name: N/A    Number of children: N/A    Years of education: N/A     Social History Main Topics    Smoking status: Former Smoker     Quit date: 2014    Smokeless tobacco: Former User     Quit date: 2010    Alcohol use 4.8 oz/week     8 Shots of liquor per week Comment: vodka or bourbon    Drug use: No    Sexual activity: Not Asked     Other Topics Concern    None     Social History Narrative       FH  Family History   Problem Relation Age of Onset    Hypertension Mother     Psychiatric Disorder Mother     Heart Disease Father 80   [facility] Elevated Lipids Sister     Psychiatric Disorder Sister     Elevated Lipids Brother     Psychiatric Disorder Brother        ALLERGIES  Allergies   Allergen Reactions    Codeine Nausea and Vomiting    Penicillins Rash     As a child.        CURRENT MEDS  Current Facility-Administered Medications   Medication Dose Route Frequency Provider Last Rate Last Dose    meclizine (ANTIVERT) tablet 25 mg  25 mg Oral QID PRN Jasmine Gama MD   25 mg at 03/05/18 0803    acetaminophen (TYLENOL) tablet 650 mg  650 mg Oral Q4H PRN Jasmine Gama MD        ibuprofen (MOTRIN) tablet 400 mg  400 mg Oral TID PRN Jasmine Gama MD   400 mg at 03/05/18 2211    amiodarone (CORDARONE) tablet 100 mg  100 mg Oral DAILY Reji Valencia MD   100 mg at 03/05/18 4545    aspirin delayed-release tablet 81 mg  81 mg Oral DAILY Reji Valencia MD   81 mg at 03/05/18 0045    atorvastatin (LIPITOR) tablet 40 mg  40 mg Oral DAILY Reji Valencia MD   40 mg at 03/05/18 0951    carvedilol (COREG) tablet 12.5 mg  12.5 mg Oral BID WITH MEALS Reji Valencia MD   12.5 mg at 03/05/18 0803    PARoxetine (PAXIL) tablet 30 mg  30 mg Oral DAILY Reji Valencia MD   30 mg at 03/05/18 0951    sodium chloride (NS) flush 5-10 mL  5-10 mL IntraVENous Q8H Reji Valencia MD        sodium chloride (NS) flush 5-10 mL  5-10 mL IntraVENous PRN Reji Valencia MD        zolpidem (AMBIEN) tablet 5 mg  5 mg Oral QHS PRN Reji Valencia MD        enoxaparin (LOVENOX) injection 40 mg  40 mg SubCUTAneous Q24H eRji Valencia MD   40 mg at 03/05/18 0952    ondansetron (ZOFRAN) injection 4 mg  4 mg IntraVENous Q6H PRN Reji Valencia MD           HOME MEDS  Prior to Admission medications    Medication Sig Start Date End Date Taking? Authorizing Provider   atorvastatin (LIPITOR) 40 mg tablet Take 40 mg by mouth daily. Yes Historical Provider   losartan (COZAAR) 50 mg tablet Take 1 Tab by mouth daily. Patient taking differently: Take 25 mg by mouth daily. 1/25/18  Yes Jg Berg MD   PARoxetine (PAXIL) 40 mg tablet Take 30 mg by mouth daily. Yes Historical Provider   carvedilol (COREG) 12.5 mg tablet Take 1 Tab by mouth two (2) times daily (with meals). 1/25/17  Yes Juli Ramos MD   amiodarone (CORDARONE) 200 mg tablet Take 100 mg by mouth daily. Yes Historical Provider   aspirin delayed-release 81 mg tablet Take 81 mg by mouth daily. Yes Historical Provider       REVIEW OF SYSTEMS:   A detailed 10-point review of systems was obtained and is negative except as mentioned in HPI        PHYSICAL EXAM  Visit Vitals    /77 (BP 1 Location: Left arm, BP Patient Position: At rest)    Pulse 82    Temp (!) 100.9 °F (38.3 °C)    Resp 20    Ht 6' (1.829 m)    Wt 84.8 kg (187 lb)    SpO2 91%    BMI 25.36 kg/m2     Constitutional:  Well-developed, cooperative, no distress. Eyes:  Conjunctivae/corneas clear.  Bilateral ptosis     Lungs:  :   Non labored breathing                     Neurologic:  Mental Status:              Alert, awake, oriented x 3             Attention and concentration normal             Language: naming, repetition, fluency normal             Memory: intact recent and remote memory             Fund of Knowledge:Normal  Cranial Nerves:  I: smell Not tested   II: visual fields Full to confrontation   II: pupils Equal, round, reactive to light   Fundus Attempted       III,IV,VI: extraocular muscles  Full ROM       V: facial light touch sensation  normal   VII: facial muscle function   symmetric   VIII: hearing symmetric   IX: soft palate elevation  normal   XI: trapezius strength  5/5   XII: tongue  midline     Motor: normal bulk and tone, mild symmetric postural tremor of upper extremities              Strength: 5/5 all four extremities  Sensory: intact to LT, PP  Coordination: FTN intact  Gait: deferred  Reflexes: 1+ throughout                       PREVIOUS WORKUP  IMAGING  MRI Results (most recent):  No results found for this or any previous visit. CT Results (most recent):    Results from Hospital Encounter encounter on 03/04/18   CT HEAD WO CONT   Narrative EXAM:  CT HEAD WO CONT    INDICATION:   dizziness, nausea, vomiting    COMPARISON: 1/23/2017. CONTRAST:  None. TECHNIQUE: Unenhanced CT of the head was performed using 5 mm images. Brain and  bone windows were generated. CT dose reduction was achieved through use of a  standardized protocol tailored for this examination and automatic exposure  control for dose modulation. FINDINGS:  The ventricles and sulci are normal in size, shape and configuration and  midline. Minimal small vessel ischemic changes are stable compared to the prior  exam. There is no intracranial hemorrhage, extra-axial collection, mass, mass  effect or midline shift. The basilar cisterns are open. No acute infarct is  identified. The bone windows demonstrate no abnormalities. The visualized  portions of the paranasal sinuses and mastoid air cells are clear. And vascular  calcification is noted. Impression IMPRESSION: No acute process or change compared to the prior exam.             Aiyana Cameron  Results for orders placed or performed during the hospital encounter of 03/04/18   URINE CULTURE HOLD SAMPLE   Result Value Ref Range    Urine culture hold        URINE ON HOLD IN MICROBIOLOGY DEPT FOR 3 DAYS. IF UNPRESERVED URINE IS SUBMITTED, IT CANNOT BE USED FOR ADDITIONAL TESTING AFTER 24 HRS, RECOLLECTION WILL BE REQUIRED.    CBC WITH AUTOMATED DIFF   Result Value Ref Range    WBC 10.6 4.1 - 11.1 K/uL    RBC 4.12 4.10 - 5.70 M/uL    HGB 13.7 12.1 - 17.0 g/dL    HCT 40.3 36.6 - 50.3 %    MCV 97.8 80.0 - 99.0 FL    MCH 33.3 26.0 - 34.0 PG    MCHC 34.0 30.0 - 36.5 g/dL    RDW 13.3 11.5 - 14.5 %    PLATELET 983 065 - 684 K/uL    MPV 10.3 8.9 - 12.9 FL    NRBC 0.0 0  WBC    ABSOLUTE NRBC 0.00 0.00 - 0.01 K/uL    NEUTROPHILS 91 (H) 32 - 75 %    LYMPHOCYTES 3 (L) 12 - 49 %    MONOCYTES 6 5 - 13 %    EOSINOPHILS 0 0 - 7 %    BASOPHILS 0 0 - 1 %    IMMATURE GRANULOCYTES 0 0.0 - 0.5 %    ABS. NEUTROPHILS 9.7 (H) 1.8 - 8.0 K/UL    ABS. LYMPHOCYTES 0.3 (L) 0.8 - 3.5 K/UL    ABS. MONOCYTES 0.6 0.0 - 1.0 K/UL    ABS. EOSINOPHILS 0.0 0.0 - 0.4 K/UL    ABS. BASOPHILS 0.0 0.0 - 0.1 K/UL    ABS. IMM. GRANS. 0.0 0.00 - 0.04 K/UL    DF SMEAR SCANNED      RBC COMMENTS STOMATOCYTES  1+       METABOLIC PANEL, COMPREHENSIVE   Result Value Ref Range    Sodium 142 136 - 145 mmol/L    Potassium 3.9 3.5 - 5.1 mmol/L    Chloride 107 97 - 108 mmol/L    CO2 28 21 - 32 mmol/L    Anion gap 7 5 - 15 mmol/L    Glucose 122 (H) 65 - 100 mg/dL    BUN 16 6 - 20 MG/DL    Creatinine 1.11 0.70 - 1.30 MG/DL    BUN/Creatinine ratio 14 12 - 20      GFR est AA >60 >60 ml/min/1.73m2    GFR est non-AA >60 >60 ml/min/1.73m2    Calcium 8.6 8.5 - 10.1 MG/DL    Bilirubin, total 0.8 0.2 - 1.0 MG/DL    ALT (SGPT) 31 12 - 78 U/L    AST (SGOT) 19 15 - 37 U/L    Alk.  phosphatase 77 45 - 117 U/L    Protein, total 7.7 6.4 - 8.2 g/dL    Albumin 3.9 3.5 - 5.0 g/dL    Globulin 3.8 2.0 - 4.0 g/dL    A-G Ratio 1.0 (L) 1.1 - 2.2     TROPONIN I   Result Value Ref Range    Troponin-I, Qt. <0.04 <0.05 ng/mL   MAGNESIUM   Result Value Ref Range    Magnesium 1.7 1.6 - 2.4 mg/dL   LACTIC ACID   Result Value Ref Range    Lactic acid 1.6 0.4 - 2.0 MMOL/L   TROPONIN I   Result Value Ref Range    Troponin-I, Qt. <0.04 <0.05 ng/mL   URINALYSIS W/MICROSCOPIC   Result Value Ref Range    Color YELLOW/STRAW      Appearance CLEAR CLEAR      Specific gravity 1.019 1.003 - 1.030      pH (UA) 7.0 5.0 - 8.0      Protein 30 (A) NEG mg/dL    Glucose NEGATIVE  NEG mg/dL    Ketone NEGATIVE NEG mg/dL    Bilirubin NEGATIVE  NEG      Blood NEGATIVE  NEG      Urobilinogen 1.0 0.2 - 1.0 EU/dL    Nitrites NEGATIVE  NEG      Leukocyte Esterase NEGATIVE  NEG      WBC 0-4 0 - 4 /hpf    RBC 0-5 0 - 5 /hpf    Epithelial cells FEW FEW /lpf    Bacteria NEGATIVE  NEG /hpf    Hyaline cast 2-5 0 - 5 /lpf   METABOLIC PANEL, COMPREHENSIVE   Result Value Ref Range    Sodium 139 136 - 145 mmol/L    Potassium 3.5 3.5 - 5.1 mmol/L    Chloride 105 97 - 108 mmol/L    CO2 28 21 - 32 mmol/L    Anion gap 6 5 - 15 mmol/L    Glucose 98 65 - 100 mg/dL    BUN 20 6 - 20 MG/DL    Creatinine 1.03 0.70 - 1.30 MG/DL    BUN/Creatinine ratio 19 12 - 20      GFR est AA >60 >60 ml/min/1.73m2    GFR est non-AA >60 >60 ml/min/1.73m2    Calcium 8.0 (L) 8.5 - 10.1 MG/DL    Bilirubin, total 0.5 0.2 - 1.0 MG/DL    ALT (SGPT) 29 12 - 78 U/L    AST (SGOT) 20 15 - 37 U/L    Alk.  phosphatase 59 45 - 117 U/L    Protein, total 6.5 6.4 - 8.2 g/dL    Albumin 3.2 (L) 3.5 - 5.0 g/dL    Globulin 3.3 2.0 - 4.0 g/dL    A-G Ratio 1.0 (L) 1.1 - 2.2     CBC W/O DIFF   Result Value Ref Range    WBC 7.4 4.1 - 11.1 K/uL    RBC 3.57 (L) 4.10 - 5.70 M/uL    HGB 12.0 (L) 12.1 - 17.0 g/dL    HCT 35.1 (L) 36.6 - 50.3 %    MCV 98.3 80.0 - 99.0 FL    MCH 33.6 26.0 - 34.0 PG    MCHC 34.2 30.0 - 36.5 g/dL    RDW 13.4 11.5 - 14.5 %    PLATELET 565 185 - 810 K/uL    MPV 10.4 8.9 - 12.9 FL    NRBC 0.0 0  WBC    ABSOLUTE NRBC 0.00 0.00 - 0.01 K/uL   FERRITIN   Result Value Ref Range    Ferritin 138 26 - 388 NG/ML   IRON PROFILE   Result Value Ref Range    Iron 17 (L) 35 - 150 ug/dL    TIBC 249 (L) 250 - 450 ug/dL    Iron % saturation 7 (L) 20 - 50 %   SED RATE (ESR)   Result Value Ref Range    Sed rate, automated 11 0 - 20 mm/hr   EKG, 12 LEAD, INITIAL   Result Value Ref Range    Ventricular Rate 89 BPM    Atrial Rate 89 BPM    P-R Interval 142 ms    QRS Duration 146 ms    Q-T Interval 442 ms    QTC Calculation (Bezet) 537 ms    Calculated R Axis 41 degrees    Calculated T Axis 29 degrees    Diagnosis       Sinus rhythm with occasional premature ventricular complexes  Right bundle branch block  When compared with ECG of 23-JAN-2017 11:25,  Right bundle branch block is now present  Confirmed by Parminder Jerome MD, Leigha Sandy ([id number]) on 3/4/2018 9:00:24 PM     EKG, 12 LEAD, SUBSEQUENT   Result Value Ref Range    Ventricular Rate 77 BPM    Atrial Rate 77 BPM    P-R Interval 174 ms    QRS Duration 150 ms    Q-T Interval 464 ms    QTC Calculation (Bezet) 525 ms    Calculated P Axis 42 degrees    Calculated R Axis 44 degrees    Calculated T Axis 32 degrees    Diagnosis       Normal sinus rhythm  Right bundle branch block  When compared with ECG of 04-MAR-2018 13:05,  MANUAL COMPARISON REQUIRED, DATA IS UNCONFIRMED  Confirmed by Parminder Jerome MD, Leigha Sandy ([id number]) on 3/4/2018 9:00:26 PM           IMPRESSION, PLAN AND RECOMMENDATIONS  Bridgett Simmons is a 74 y.o. male who is being seen in consultation for dizziness likely related to arrythmia or a cardiac cause. Cardiology will evaluate. MRI Brain not done yet. We can answer any questions that may be there about the result of that.  Otherwise will sign off      Andi Meyer MD  Neurologist

## 2018-03-05 NOTE — PROGRESS NOTES
Assumed care of pt this afternoon. Pt is alert and oriented with no known needs at this time. Orthostatic BP completed yesterday, in Doc flow sheets under VS(and more VS). Does not appear to be orthostatic (118/61 supine, 119/69 sitting and 114/66 standing). On call MD paged and given results. Pt to be discharged this evening. Pt has been given all discharge instructions with no questions at this time.

## 2018-03-05 NOTE — ED NOTES
Verbal shift change report given to [facility] (oncoming nurse) by Salud Randall (offgoing nurse). Report included the following information SBAR, Kardex, Intake/Output, MAR and Recent Results.

## 2018-03-05 NOTE — DISCHARGE INSTRUCTIONS
Patient Discharge Instructions    Jorge Morataya / [id number] : 1945    Admitted 3/4/2018 Discharged: 3/5/2018     Take Home Medications       · It is important that you take the medication exactly as they are prescribed. · Keep your medication in the bottles provided by the pharmacist and keep a list of the medication names, dosages, and times to be taken in your wallet. · Do not take other medications without consulting your doctor. What to do at Home    Recommended diet: Cardiac Diet. Recommended activity: Activity as tolerated. Follow-up with Dr. Waleska Landa in 1 week. Information obtained by :  I understand that if any problems occur once I am at home I am to contact my physician. I understand and acknowledge receipt of the instructions indicated above.                                                                                                                                            Physician's or R.N.'s Signature                                                                  Date/Time                                                                                                                                              Patient or Representative Signature                                                          Date/Time

## 2018-03-05 NOTE — PROGRESS NOTES
Kd Henley, Diana [facility] Carmen    Admit Date: 3/4/2018    Subjective:     Events noted. His main issue is L-sided headache and vertigo sx's with some nausea. The vertigo/nausea is improved some, but the HA persists some -- Tylenol did help. He does not seem to have had any lightheadedness. No other new complaints. Current Facility-Administered Medications   Medication Dose Route Frequency    meclizine (ANTIVERT) tablet 25 mg  25 mg Oral QID PRN    acetaminophen (TYLENOL) tablet 650 mg  650 mg Oral Q4H PRN    ibuprofen (MOTRIN) tablet 400 mg  400 mg Oral TID PRN    amiodarone (CORDARONE) tablet 100 mg  100 mg Oral DAILY    aspirin delayed-release tablet 81 mg  81 mg Oral DAILY    atorvastatin (LIPITOR) tablet 40 mg  40 mg Oral DAILY    carvedilol (COREG) tablet 12.5 mg  12.5 mg Oral BID WITH MEALS    PARoxetine (PAXIL) tablet 30 mg  30 mg Oral DAILY    sodium chloride (NS) flush 5-10 mL  5-10 mL IntraVENous Q8H    sodium chloride (NS) flush 5-10 mL  5-10 mL IntraVENous PRN    zolpidem (AMBIEN) tablet 5 mg  5 mg Oral QHS PRN    enoxaparin (LOVENOX) injection 40 mg  40 mg SubCUTAneous Q24H    ondansetron (ZOFRAN) injection 4 mg  4 mg IntraVENous Q6H PRN     Current Outpatient Prescriptions   Medication Sig    atorvastatin (LIPITOR) 40 mg tablet Take 40 mg by mouth daily.  losartan (COZAAR) 50 mg tablet Take 1 Tab by mouth daily. (Patient taking differently: Take 25 mg by mouth daily.)    PARoxetine (PAXIL) 40 mg tablet Take 30 mg by mouth daily.  carvedilol (COREG) 12.5 mg tablet Take 1 Tab by mouth two (2) times daily (with meals).  amiodarone (CORDARONE) 200 mg tablet Take 100 mg by mouth daily.  aspirin delayed-release 81 mg tablet Take 81 mg by mouth daily.           Objective:     Patient Vitals for the past 8 hrs:   BP Pulse Resp SpO2   03/05/18 0500 141/71 82 20 90 %   03/05/18 0400 132/68 74 24 90 %   03/05/18 0100 150/83 86 26 91 %   03/05/18 0000 150/79 82 20 94 %             Physical Exam: NAD. A&O. Neck -- Supple. No JVD. Heart -- RRR. Lungs -- CTA. Abd -- Benign. Ext -- No LE edema, b/l. Data Review   Recent Results (from the past 24 hour(s))   CBC WITH AUTOMATED DIFF    Collection Time: 03/04/18 12:27 PM   Result Value Ref Range    WBC 10.6 4.1 - 11.1 K/uL    RBC 4.12 4.10 - 5.70 M/uL    HGB 13.7 12.1 - 17.0 g/dL    HCT 40.3 36.6 - 50.3 %    MCV 97.8 80.0 - 99.0 FL    MCH 33.3 26.0 - 34.0 PG    MCHC 34.0 30.0 - 36.5 g/dL    RDW 13.3 11.5 - 14.5 %    PLATELET 106 791 - 004 K/uL    MPV 10.3 8.9 - 12.9 FL    NRBC 0.0 0  WBC    ABSOLUTE NRBC 0.00 0.00 - 0.01 K/uL    NEUTROPHILS 91 (H) 32 - 75 %    LYMPHOCYTES 3 (L) 12 - 49 %    MONOCYTES 6 5 - 13 %    EOSINOPHILS 0 0 - 7 %    BASOPHILS 0 0 - 1 %    IMMATURE GRANULOCYTES 0 0.0 - 0.5 %    ABS. NEUTROPHILS 9.7 (H) 1.8 - 8.0 K/UL    ABS. LYMPHOCYTES 0.3 (L) 0.8 - 3.5 K/UL    ABS. MONOCYTES 0.6 0.0 - 1.0 K/UL    ABS. EOSINOPHILS 0.0 0.0 - 0.4 K/UL    ABS. BASOPHILS 0.0 0.0 - 0.1 K/UL    ABS. IMM. GRANS. 0.0 0.00 - 0.04 K/UL    DF SMEAR SCANNED      RBC COMMENTS STOMATOCYTES  1+       METABOLIC PANEL, COMPREHENSIVE    Collection Time: 03/04/18 12:27 PM   Result Value Ref Range    Sodium 142 136 - 145 mmol/L    Potassium 3.9 3.5 - 5.1 mmol/L    Chloride 107 97 - 108 mmol/L    CO2 28 21 - 32 mmol/L    Anion gap 7 5 - 15 mmol/L    Glucose 122 (H) 65 - 100 mg/dL    BUN 16 6 - 20 MG/DL    Creatinine 1.11 0.70 - 1.30 MG/DL    BUN/Creatinine ratio 14 12 - 20      GFR est AA >60 >60 ml/min/1.73m2    GFR est non-AA >60 >60 ml/min/1.73m2    Calcium 8.6 8.5 - 10.1 MG/DL    Bilirubin, total 0.8 0.2 - 1.0 MG/DL    ALT (SGPT) 31 12 - 78 U/L    AST (SGOT) 19 15 - 37 U/L    Alk.  phosphatase 77 45 - 117 U/L    Protein, total 7.7 6.4 - 8.2 g/dL    Albumin 3.9 3.5 - 5.0 g/dL    Globulin 3.8 2.0 - 4.0 g/dL    A-G Ratio 1.0 (L) 1.1 - 2.2     TROPONIN I    Collection Time: 03/04/18 12:27 PM   Result Value Ref Range    Troponin-I, Qt. <0.04 <0.05 ng/mL   MAGNESIUM    Collection Time: 03/04/18 12:27 PM   Result Value Ref Range    Magnesium 1.7 1.6 - 2.4 mg/dL   EKG, 12 LEAD, INITIAL    Collection Time: 03/04/18  1:05 PM   Result Value Ref Range    Ventricular Rate 89 BPM    Atrial Rate 89 BPM    P-R Interval 142 ms    QRS Duration 146 ms    Q-T Interval 442 ms    QTC Calculation (Bezet) 537 ms    Calculated R Axis 41 degrees    Calculated T Axis 29 degrees    Diagnosis       Sinus rhythm with occasional premature ventricular complexes  Right bundle branch block  When compared with ECG of 23-JAN-2017 11:25,  Right bundle branch block is now present  Confirmed by Margoth Edwards MD, Kami Cheek ([id number]) on 3/4/2018 9:00:24 PM     EKG, 12 LEAD, SUBSEQUENT    Collection Time: 03/04/18  1:59 PM   Result Value Ref Range    Ventricular Rate 77 BPM    Atrial Rate 77 BPM    P-R Interval 174 ms    QRS Duration 150 ms    Q-T Interval 464 ms    QTC Calculation (Bezet) 525 ms    Calculated P Axis 42 degrees    Calculated R Axis 44 degrees    Calculated T Axis 32 degrees    Diagnosis       Normal sinus rhythm  Right bundle branch block  When compared with ECG of 04-MAR-2018 13:05,  MANUAL COMPARISON REQUIRED, DATA IS UNCONFIRMED  Confirmed by Margoth Edwards MD, Kami Cheek ([id number]) on 3/4/2018 9:00:26 PM     LACTIC ACID    Collection Time: 03/04/18  3:13 PM   Result Value Ref Range    Lactic acid 1.6 0.4 - 2.0 MMOL/L   TROPONIN I    Collection Time: 03/04/18  5:33 PM   Result Value Ref Range    Troponin-I, Qt. <0.04 <0.05 ng/mL   URINALYSIS W/MICROSCOPIC    Collection Time: 03/04/18  6:42 PM   Result Value Ref Range    Color YELLOW/STRAW      Appearance CLEAR CLEAR      Specific gravity 1.019 1.003 - 1.030      pH (UA) 7.0 5.0 - 8.0      Protein 30 (A) NEG mg/dL    Glucose NEGATIVE  NEG mg/dL    Ketone NEGATIVE  NEG mg/dL    Bilirubin NEGATIVE  NEG      Blood NEGATIVE  NEG      Urobilinogen 1.0 0.2 - 1.0 EU/dL    Nitrites NEGATIVE  NEG      Leukocyte Esterase NEGATIVE  NEG      WBC 0-4 0 - 4 /hpf    RBC 0-5 0 - 5 /hpf    Epithelial cells FEW FEW /lpf    Bacteria NEGATIVE  NEG /hpf    Hyaline cast 2-5 0 - 5 /lpf   URINE CULTURE HOLD SAMPLE    Collection Time: 03/04/18  6:42 PM   Result Value Ref Range    Urine culture hold        URINE ON HOLD IN MICROBIOLOGY DEPT FOR 3 DAYS. IF UNPRESERVED URINE IS SUBMITTED, IT CANNOT BE USED FOR ADDITIONAL TESTING AFTER 24 HRS, RECOLLECTION WILL BE REQUIRED. METABOLIC PANEL, COMPREHENSIVE    Collection Time: 03/05/18  3:50 AM   Result Value Ref Range    Sodium 139 136 - 145 mmol/L    Potassium 3.5 3.5 - 5.1 mmol/L    Chloride 105 97 - 108 mmol/L    CO2 28 21 - 32 mmol/L    Anion gap 6 5 - 15 mmol/L    Glucose 98 65 - 100 mg/dL    BUN 20 6 - 20 MG/DL    Creatinine 1.03 0.70 - 1.30 MG/DL    BUN/Creatinine ratio 19 12 - 20      GFR est AA >60 >60 ml/min/1.73m2    GFR est non-AA >60 >60 ml/min/1.73m2    Calcium 8.0 (L) 8.5 - 10.1 MG/DL    Bilirubin, total 0.5 0.2 - 1.0 MG/DL    ALT (SGPT) 29 12 - 78 U/L    AST (SGOT) 20 15 - 37 U/L    Alk.  phosphatase 59 45 - 117 U/L    Protein, total 6.5 6.4 - 8.2 g/dL    Albumin 3.2 (L) 3.5 - 5.0 g/dL    Globulin 3.3 2.0 - 4.0 g/dL    A-G Ratio 1.0 (L) 1.1 - 2.2     CBC W/O DIFF    Collection Time: 03/05/18  3:50 AM   Result Value Ref Range    WBC 7.4 4.1 - 11.1 K/uL    RBC 3.57 (L) 4.10 - 5.70 M/uL    HGB 12.0 (L) 12.1 - 17.0 g/dL    HCT 35.1 (L) 36.6 - 50.3 %    MCV 98.3 80.0 - 99.0 FL    MCH 33.6 26.0 - 34.0 PG    MCHC 34.2 30.0 - 36.5 g/dL    RDW 13.4 11.5 - 14.5 %    PLATELET 049 012 - 623 K/uL    MPV 10.4 8.9 - 12.9 FL    NRBC 0.0 0  WBC    ABSOLUTE NRBC 0.00 0.00 - 0.01 K/uL           Assessment:     Principal Problem:    Dizziness (3/4/2018)    Active Problems:    ASHD (arteriosclerotic heart disease) (6/17/2015)      Hypertension, benign (6/17/2015) Ventricular premature beats (6/17/2015)      VT (ventricular tachycardia) ([facility] [address] (7/7/2016)      Other headache syndrome (3/5/2018)      Borderline anemia -- Probably dilutional.          Plan:     1. Give PRN Meclizine. 2. Check MRI, ESR, and ask neurology to see. 3. I do not think this is a cardiac issue, and I have cancelled formal cardiology consult. 4. Check Iron profile. 5. He has a DNR order -- This surprised me, so I reviewed this with him -- He meant to say he does not want to be on life support indefinitely, but he would want resuscitation if needed if there is a chance of meaningful recovery. .... Therefore he is a FULL CODE. 6. He may be able to be discharged later this afternoon/evening.           Yoselin Abarca MD

## 2018-03-05 NOTE — PROGRESS NOTES
Met with pt at the bedside, wife also present. Pt is independent in care, wife at bedside. Confirmed understanding of observation status, paperwork received. Pt expects to return home at discharge when medically stable with outpatient follow up.     LISA Mcrae

## 2018-03-05 NOTE — H&P
History and Physical    Subjective:     Kranthi Fuller is a 74 y.o.  male who presents with dizziness and weakness. Has had some dizziness for a few days along with left sided headache, but woke up today dizzy, could hardly stand. Had some nausea, but did not vomit until he got to ER. He has had a cough for the last couple days, no fever, no diarrhea, no chest pain or sob. Elvis Ortiz Past Medical History:   Diagnosis Date    BPH (benign prostatic hyperplasia)     s/p TURP    CAD (coronary artery disease)     stent to cx 2014    Cancer ([facility] [address] 1985    superficial spreading melanoma    GERD (gastroesophageal reflux disease)     Hypertension     Psychiatric disorder 1990's, 2014    situational depression     Allergies   Allergen Reactions    Codeine Nausea and Vomiting    Penicillins Rash     As a child. Prior to Admission medications    Medication Sig Start Date End Date Taking? Authorizing Provider   atorvastatin (LIPITOR) 40 mg tablet Take 40 mg by mouth daily. Yes Historical Provider   losartan (COZAAR) 50 mg tablet Take 1 Tab by mouth daily. Patient taking differently: Take 25 mg by mouth daily. 1/25/18  Yes Jasmine Gama MD   PARoxetine (PAXIL) 40 mg tablet Take 30 mg by mouth daily. Yes Historical Provider   carvedilol (COREG) 12.5 mg tablet Take 1 Tab by mouth two (2) times daily (with meals). 1/25/17  Yes Bishop Estephania MD   amiodarone (CORDARONE) 200 mg tablet Take 100 mg by mouth daily. Yes Historical Provider   aspirin delayed-release 81 mg tablet Take 81 mg by mouth daily.    Yes Historical Provider     Social History   Substance Use Topics    Smoking status: Former Smoker     Quit date: 7/7/2014    Smokeless tobacco: Former User     Quit date: 7/7/2010    Alcohol use 4.8 oz/week     8 Shots of liquor per week      Comment: vodka or bourbon     Family History   Problem Relation Age of Onset    Hypertension Mother     Psychiatric Disorder Mother     Heart Disease Father 80    Elevated Lipids Sister     Psychiatric Disorder Sister    Andrea Santos Elevated Lipids Brother     Psychiatric Disorder Brother                Review of Systems:  As above. Objective:       Physical Exam: wdwn 73 yo wm  In NAD. HEENT -- Pupils round. O/P Clear. Tm- occluded with cerumen  Neck -- Supple. No JVD. no acn ? bruits  Heart - irreg, multiple pvc's on monitor  Lungs -- CTA. Abdomen -- Soft. Non-tender. Non-distended. No masses. Bowel sounds present. Extremities -- No edema. Neuro- nonfocal, cn 2-12 grossly intact  Good strength and sensation        Data Review:   Recent Results (from the past 24 hour(s))   CBC WITH AUTOMATED DIFF    Collection Time: 03/04/18 12:27 PM   Result Value Ref Range    WBC 10.6 4.1 - 11.1 K/uL    RBC 4.12 4.10 - 5.70 M/uL    HGB 13.7 12.1 - 17.0 g/dL    HCT 40.3 36.6 - 50.3 %    MCV 97.8 80.0 - 99.0 FL    MCH 33.3 26.0 - 34.0 PG    MCHC 34.0 30.0 - 36.5 g/dL    RDW 13.3 11.5 - 14.5 %    PLATELET 575 903 - 486 K/uL    MPV 10.3 8.9 - 12.9 FL    NRBC 0.0 0  WBC    ABSOLUTE NRBC 0.00 0.00 - 0.01 K/uL    NEUTROPHILS 91 (H) 32 - 75 %    LYMPHOCYTES 3 (L) 12 - 49 %    MONOCYTES 6 5 - 13 %    EOSINOPHILS 0 0 - 7 %    BASOPHILS 0 0 - 1 %    IMMATURE GRANULOCYTES 0 0.0 - 0.5 %    ABS. NEUTROPHILS 9.7 (H) 1.8 - 8.0 K/UL    ABS. LYMPHOCYTES 0.3 (L) 0.8 - 3.5 K/UL    ABS. MONOCYTES 0.6 0.0 - 1.0 K/UL    ABS. EOSINOPHILS 0.0 0.0 - 0.4 K/UL    ABS. BASOPHILS 0.0 0.0 - 0.1 K/UL    ABS. IMM.  GRANS. 0.0 0.00 - 0.04 K/UL    DF SMEAR SCANNED      RBC COMMENTS STOMATOCYTES  1+       METABOLIC PANEL, COMPREHENSIVE    Collection Time: 03/04/18 12:27 PM   Result Value Ref Range    Sodium 142 136 - 145 mmol/L    Potassium 3.9 3.5 - 5.1 mmol/L    Chloride 107 97 - 108 mmol/L    CO2 28 21 - 32 mmol/L    Anion gap 7 5 - 15 mmol/L    Glucose 122 (H) 65 - 100 mg/dL    BUN 16 6 - 20 MG/DL    Creatinine 1.11 0.70 - 1.30 MG/DL    BUN/Creatinine ratio 14 12 - 20      GFR est AA >60 >60 ml/min/1.73m2    GFR est non-AA >60 >60 ml/min/1.73m2    Calcium 8.6 8.5 - 10.1 MG/DL    Bilirubin, total 0.8 0.2 - 1.0 MG/DL    ALT (SGPT) 31 12 - 78 U/L    AST (SGOT) 19 15 - 37 U/L    Alk.  phosphatase 77 45 - 117 U/L    Protein, total 7.7 6.4 - 8.2 g/dL    Albumin 3.9 3.5 - 5.0 g/dL    Globulin 3.8 2.0 - 4.0 g/dL    A-G Ratio 1.0 (L) 1.1 - 2.2     TROPONIN I    Collection Time: 03/04/18 12:27 PM   Result Value Ref Range    Troponin-I, Qt. <0.04 <0.05 ng/mL   MAGNESIUM    Collection Time: 03/04/18 12:27 PM   Result Value Ref Range    Magnesium 1.7 1.6 - 2.4 mg/dL   EKG, 12 LEAD, INITIAL    Collection Time: 03/04/18  1:05 PM   Result Value Ref Range    Ventricular Rate 89 BPM    Atrial Rate 89 BPM    P-R Interval 142 ms    QRS Duration 146 ms    Q-T Interval 442 ms    QTC Calculation (Bezet) 537 ms    Calculated R Axis 41 degrees    Calculated T Axis 29 degrees    Diagnosis       Sinus rhythm with occasional premature ventricular complexes  Right bundle branch block  When compared with ECG of 23-JAN-2017 11:25,  Right bundle branch block is now present     EKG, 12 LEAD, SUBSEQUENT    Collection Time: 03/04/18  1:59 PM   Result Value Ref Range    Ventricular Rate 77 BPM    Atrial Rate 77 BPM    P-R Interval 174 ms    QRS Duration 150 ms    Q-T Interval 464 ms    QTC Calculation (Bezet) 525 ms    Calculated P Axis 42 degrees    Calculated R Axis 44 degrees    Calculated T Axis 32 degrees    Diagnosis       Normal sinus rhythm  Right bundle branch block  When compared with ECG of 04-MAR-2018 13:05,  MANUAL COMPARISON REQUIRED, DATA IS UNCONFIRMED     LACTIC ACID    Collection Time: 03/04/18  3:13 PM   Result Value Ref Range    Lactic acid 1.6 0.4 - 2.0 MMOL/L   TROPONIN I    Collection Time: 03/04/18  5:33 PM   Result Value Ref Range    Troponin-I, Qt. <0.04 <0.05 ng/mL   URINALYSIS W/MICROSCOPIC    Collection Time: 03/04/18  6:42 PM   Result Value Ref Range    Color YELLOW/STRAW      Appearance CLEAR CLEAR Specific gravity 1.019 1.003 - 1.030      pH (UA) 7.0 5.0 - 8.0      Protein 30 (A) NEG mg/dL    Glucose NEGATIVE  NEG mg/dL    Ketone NEGATIVE  NEG mg/dL    Bilirubin NEGATIVE  NEG      Blood NEGATIVE  NEG      Urobilinogen 1.0 0.2 - 1.0 EU/dL    Nitrites NEGATIVE  NEG      Leukocyte Esterase NEGATIVE  NEG      WBC 0-4 0 - 4 /hpf    RBC 0-5 0 - 5 /hpf    Epithelial cells FEW FEW /lpf    Bacteria NEGATIVE  NEG /hpf    Hyaline cast 2-5 0 - 5 /lpf   URINE CULTURE HOLD SAMPLE    Collection Time: 03/04/18  6:42 PM   Result Value Ref Range    Urine culture hold        URINE ON HOLD IN MICROBIOLOGY DEPT FOR 3 DAYS. IF UNPRESERVED URINE IS SUBMITTED, IT CANNOT BE USED FOR ADDITIONAL TESTING AFTER 24 HRS, RECOLLECTION WILL BE REQUIRED. Assessment:     Active Problems:    ASHD (arteriosclerotic heart disease) (6/17/2015)      Ventricular premature beats (6/17/2015)      VT (ventricular tachycardia) ([facility] [address] (7/7/2016)      Dizziness of unknown cause (3/4/2018)        Plan:     Admit for observation.   Hold losartan due to hypotension on arrival, cards consult in am  Hydrate  dvt prophylaxis    Signed By: Pelon Thomas MD     March 4, 2018

## 2018-04-17 ENCOUNTER — HOSPITAL ENCOUNTER (OUTPATIENT)
Dept: CARDIAC CATH/INVASIVE PROCEDURES | Age: 73
Discharge: HOME | End: 2018-04-17
Attending: INTERNAL MEDICINE | Admitting: INTERNAL MEDICINE
Payer: MEDICARE

## 2018-04-17 VITALS
HEIGHT: 72 IN | HEART RATE: 62 BPM | BODY MASS INDEX: 25.73 KG/M2 | DIASTOLIC BLOOD PRESSURE: 73 MMHG | WEIGHT: 190 LBS | SYSTOLIC BLOOD PRESSURE: 151 MMHG | RESPIRATION RATE: 20 BRPM | OXYGEN SATURATION: 97 % | TEMPERATURE: 98.6 F

## 2018-04-17 PROCEDURE — 93660 TILT TABLE EVALUATION: CPT

## 2018-04-17 RX ORDER — ATROPINE SULFATE 0.1 MG/ML
1 INJECTION INTRAVENOUS AS NEEDED
Status: DISCONTINUED | OUTPATIENT
Start: 2018-04-17 | End: 2018-04-17

## 2018-04-17 RX ORDER — SODIUM CHLORIDE 0.9 % (FLUSH) 0.9 %
10 SYRINGE (ML) INJECTION AS NEEDED
Status: DISCONTINUED | OUTPATIENT
Start: 2018-04-17 | End: 2018-04-17

## 2018-04-17 RX ORDER — NITROGLYCERIN 0.4 MG/1
0.4 TABLET SUBLINGUAL AS NEEDED
Status: DISCONTINUED | OUTPATIENT
Start: 2018-04-17 | End: 2018-04-17

## 2018-04-17 NOTE — PROGRESS NOTES
Transfer to [address] [place] [address] from Procedure Area    Verbal report given to Catha All on Eric Newby being transferred to Cardiac Cath Lab  for routine progression of care   Patient is post tilt procedure. Patient stable upon transfer to . Report consisted of patients Situation, Background, Assessment and   Recommendations(SBAR). Information from the following report(s) Kardex, Procedure Summary, Intake/Output, MAR and Recent Results was reviewed with the receiving nurse. Opportunity for questions and clarification was provided. Patient medicated during procedure with orders obtained and verified by Dr. Nahid Rivera. Refer to patient PROCEDURE REPORT for vital signs, assessment, status, and response during procedure.

## 2018-04-17 NOTE — PROCEDURES
Tilt Table Test    Pt was NPO and gave informed consent prior to the procedure    Baseline:  /91  HR 60  Slightly nauseated before tilt  Upright tilt at 1 min:  /84  HR 62  Upright tilt at 5 min:  /93  HR 66  Feels better  Upright tilt at 10 min:  /87  HR 62  Upright tilt at 15 min:  /77  HR 60  Upright tilt at 17 min:  /67  HR 66  No symptoms  Upright tilt at 19 min   /77  HR 75  Upright tilt at 20 min:  /68  HR 60  Upright tilt at 24 min:  BP 82/50  HR 60  Upright tilt at 25 min:  BP 86/54  HR 60  Nauseated but no dizziness  Upright tilt at 27 min:  BP 95/60  HR 61   \"                 \"     \"      \"  Upright tilt at 30 min:  BP 77/51  HR 61  Vision dark and knees weak  Pt returned to supine position and immediately felt better  Bilateral carotid massage performed and was negative. Imp:  Positive tilt table test for reproduction of the patient's symptoms and evidence of orthostatic hypotension ( came on slowly). This was associated with lack of any heart rate response. Negative carotid massage. Rec:  Compression socks  May want to adjust meds to allow possible HR response?      Eitan Bolivar MD

## 2018-04-17 NOTE — PROGRESS NOTES
Cardiac Cath Lab Procedure Area Arrival Note:    Brown Rawls arrived to Cardiac Cath Lab, Procedure Area. Patient identifiers verified with NAME and DATE OF BIRTH. Procedure verified with patient. Consent forms verified. Allergies verified. Patient informed of procedure and plan of care. Questions answered with review. Patient voiced understanding of procedure and plan of care. Patient on cardiac monitor, non-invasive blood pressure, SPO2 monitor. On room air. IV of normal saline on standby. Patient status doing well without problems. Patient is A&Ox 4. Patient reports no pain. Patient medicated during procedure with orders obtained and verified by Dr. Brooks Mccord. Refer to patients Cardiac Cath Lab PROCEDURE REPORT for vital signs, assessment, status, and response during procedure, printed at end of case. Printed report on chart or scanned into chart.

## 2018-04-17 NOTE — PROGRESS NOTES
Cardiac Cath Lab Recovery Arrival Note:      Mayte Abarca arrived to Cardiac Cath Lab, Recovery Area. Staff introduced to patient. Patient identifiers verified with NAME and DATE OF BIRTH. Procedure verified with patient. Consent forms reviewed and signed by patient or authorized representative and verified. Allergies verified. Patient informed of procedure and plan of care. Questions answered with review. Patient prepped for procedure, per orders from physician, prior to arrival.    Patient on cardiac monitor, non-invasive blood pressure, SPO2 monitor. Patient is A&Ox 4. Patient reports no pain, no chest pain, no n/v. Patient in stretcher, in low position, with side rails up, call bell within reach, patient instructed to call of assistance as needed. Patient prep in: [facility] Recovery Area, Bed# 3. Family in: Wife: Rita Valencia [phone].    Prep by: Mera Smith RN

## 2018-04-17 NOTE — PROGRESS NOTES
TRANSFER - IN REPORT:    Verbal report received from Cain Mcdonough RN on ROBLOX, Procedure Tilt Table Study , from the Cardiac Cath lab, for routine progression of care. Report consisted of patients Situation, Background, Assessment and Recommendations(SBAR). Information from the following report(s) Procedure Summary, MAR and Recent Results was reviewed with the receiving clinician. Opportunity for questions and clarification was provided. Assessment completed upon patients arrival to [address] [place] [address] and care assumed. Cardiac Cath Lab Recovery Arrival Note:     ROBLOX arrived to [facility] recovery area. Patient procedure= Tilt Table Study. Patient on cardiac monitor, non-invasive blood pressure, Patient status doing well without problems. Patient is A&Ox 4.  Patient reports no pain, no chest pain, no n/v.

## 2018-04-17 NOTE — PROGRESS NOTES
I have reviewed discharge instructions with the patient and spouse. The patient and spouse verbalized understanding. Copy of discharge instructions given to patient.

## 2018-04-17 NOTE — DISCHARGE INSTRUCTIONS
Tilt Table Test: About This Test  What is it? A tilt table test is used to check people who have fainted or who often feel lightheaded. The results help your doctor know the cause of your fainting or feeling lightheaded. The test uses a special table that slowly tilts you to an upright position. It checks how your body responds when you change positions. Why is this test done? This test checks what causes your symptoms by monitoring them while changing your position. Your doctor can see if you faint or feel lightheaded because of problems with your heart rate or blood pressure. When people move from a lying position to an upright one, their blood pressure normally drops. But the body adjusts to this. Your nervous system senses changes in body position and controls your heart rate and blood pressure. If the nervous system doesn't work properly, you might feel lightheaded or faint. This can happen if your blood pressure stays too low. Your heart rate also may slow down or speed up. You feel lightheaded because your brain is not getting a normal amount of blood for a short time. This problem is called syncope (say \"[id number]\"). Syncope might happen during the test when you change to an upright position. How can you prepare for the test?  · Tell your doctor about any medicines you take. Ask your doctor if you need to stop taking any medicines before the test.  · You may be asked to not eat or drink for a few hours before the test.  What happens during the test?  · The test is usually done in a hospital or a cardiologist's office. · You will have small patches or pads attached to your skin. These are sensors that monitor your heart. You will also have a blood pressure cuff on your arm. And you may have an IV. · During the test, you will lie flat on a table that can tilt you up to almost a standing position. You will be strapped securely to the table.   · Your heart rate and blood pressure are checked regularly as the table is tilted up. · You will be asked if you feel any symptoms like nausea, sweating, dizziness, or an abnormal heartbeat. If you don't have any symptoms, you may be given medicine to speed up your heart rate. Then you will be checked for symptoms again. · If you faint during the test, the table will be returned to a flat position. You will be checked closely and taken care of right away by your medical team. Most people wake up right away. What else should you know about the test?  · The test result is normal if your blood pressure stays stable during the test and you do not feel lightheaded or faint. The test result is not normal if your blood pressure drops and you feel lightheaded or faint. These symptoms might happen because of a slow heart rate. How long does the test take? · The test will take about an hour. It may take longer if you get medicine to speed up your heart during the test.  What happens after the test?  · Your heart rate and blood pressure will be checked before you go home. · You may need to have someone drive you home after the test.  · You can probably go back to your usual activities right away. But some people feel a little tired or nauseated  Follow-up care is a key part of your treatment and safety. Be sure to make and go to all appointments, and call your doctor if you are having problems. It's also a good idea to keep a list of the medicines you take. Ask your doctor when you can expect to have your test results. Where can you learn more? Go to http://karo-sirisha.info/. Enter L514 in the search box to learn more about \"Tilt Table Test: About This Test.\"  Current as of: September 21, 2016  Content Version: 11.4  © 2177-0102 [employer]. Care instructions adapted under license by [employer] (which disclaims liability or warranty for this information).  If you have questions about a medical condition or this instruction, always ask your healthcare professional. [place] [address] any warranty or liability for your use of this information.

## 2018-04-17 NOTE — IP AVS SNAPSHOT
Rohit [address] [place] [address] 
[phone] Patient: Messi Snider MRN: [id number] AXT:7/26/5755 About your hospitalization You were admitted on:  April 17, 2018 You last received care in the:  [address], [facility]/s  CATH LAB You were discharged on:  April 17, 2018 Why you were hospitalized Your primary diagnosis was:  Not on File Your diagnoses also included:  Dizziness Follow-up Information Follow up With Details Comments Contact Info Gaye Maier MD   [place] [address] 
[phone] Gaye Maier MD In 1 week  [place] [address] 
[phone] Your Scheduled Appointments Monday April 30, 2018  9:30 AM EDT  
ESTABLISHED PATIENT with MD Demond Cuello TURNER, [address] [place] [address] ([facility] [address] [place] [address]  
[phone] Discharge Orders None A check kourtney indicates which time of day the medication should be taken. My Medications CONTINUE taking these medications Instructions Each Dose to Equal  
 Morning Noon Evening Bedtime  
 amiodarone 200 mg tablet Commonly known as:  CORDARONE Your last dose was: Your next dose is: Take 100 mg by mouth daily. 100 mg  
    
   
   
   
  
 aspirin delayed-release 81 mg tablet Your last dose was: Your next dose is: Take 81 mg by mouth daily. 81 mg  
    
   
   
   
  
 atorvastatin 40 mg tablet Commonly known as:  LIPITOR Your last dose was: Your next dose is: Take 40 mg by mouth daily. 40 mg  
    
   
   
   
  
 carvedilol 12.5 mg tablet Commonly known as:  Saul Lacrosse Your last dose was: Your next dose is: Take 1 Tab by mouth two (2) times daily (with meals).   
 12.5 mg  
    
   
   
   
  
 PARoxetine 30 mg tablet Commonly known as:  PAXIL Your last dose was: Your next dose is: TAKE 1 TABLET BY MOUTH EVERY MORNING. Discharge Instructions Tilt Table Test: About This Test 
What is it? A tilt table test is used to check people who have fainted or who often feel lightheaded. The results help your doctor know the cause of your fainting or feeling lightheaded. The test uses a special table that slowly tilts you to an upright position. It checks how your body responds when you change positions. Why is this test done? This test checks what causes your symptoms by monitoring them while changing your position. Your doctor can see if you faint or feel lightheaded because of problems with your heart rate or blood pressure. When people move from a lying position to an upright one, their blood pressure normally drops. But the body adjusts to this. Your nervous system senses changes in body position and controls your heart rate and blood pressure. If the nervous system doesn't work properly, you might feel lightheaded or faint. This can happen if your blood pressure stays too low. Your heart rate also may slow down or speed up. You feel lightheaded because your brain is not getting a normal amount of blood for a short time. This problem is called syncope (say \"CEEY-wtd-jrb\"). Syncope might happen during the test when you change to an upright position. How can you prepare for the test? 
· Tell your doctor about any medicines you take. Ask your doctor if you need to stop taking any medicines before the test. 
· You may be asked to not eat or drink for a few hours before the test. 
What happens during the test? 
· The test is usually done in a hospital or a cardiologist's office. · You will have small patches or pads attached to your skin. These are sensors that monitor your heart.  You will also have a blood pressure cuff on your arm. And you may have an IV. · During the test, you will lie flat on a table that can tilt you up to almost a standing position. You will be strapped securely to the table. · Your heart rate and blood pressure are checked regularly as the table is tilted up. · You will be asked if you feel any symptoms like nausea, sweating, dizziness, or an abnormal heartbeat. If you don't have any symptoms, you may be given medicine to speed up your heart rate. Then you will be checked for symptoms again. · If you faint during the test, the table will be returned to a flat position. You will be checked closely and taken care of right away by your medical team. Most people wake up right away. What else should you know about the test? 
· The test result is normal if your blood pressure stays stable during the test and you do not feel lightheaded or faint. The test result is not normal if your blood pressure drops and you feel lightheaded or faint. These symptoms might happen because of a slow heart rate. How long does the test take? · The test will take about an hour. It may take longer if you get medicine to speed up your heart during the test. 
What happens after the test? 
· Your heart rate and blood pressure will be checked before you go home. · You may need to have someone drive you home after the test. 
· You can probably go back to your usual activities right away. But some people feel a little tired or nauseated Follow-up care is a key part of your treatment and safety. Be sure to make and go to all appointments, and call your doctor if you are having problems. It's also a good idea to keep a list of the medicines you take. Ask your doctor when you can expect to have your test results. Where can you learn more? Go to http://karo-sirisha.info/. Enter J754 in the search box to learn more about \"Tilt Table Test: About This Test.\" Current as of: September 21, 2016 Content Version: 11.4 © 6826-4731 Healthwise, Incorporated. Care instructions adapted under license by [employer] (which disclaims liability or warranty for this information). If you have questions about a medical condition or this instruction, always ask your healthcare professional. [address] any warranty or liability for your use of this information. Introducing [facility]! Dear Bertin Monday: 
Thank you for requesting a [employer] account. Our records indicate that you already have an active [employer] account. You can access your account anytime at [email or web address] Did you know that you can access your hospital and ER discharge instructions at any time in [employer]? You can also review all of your test results from your hospital stay or ER visit. Additional Information If you have questions, please visit the Frequently Asked Questions section of the [employer] website at [email or web address] Remember, [employer] is NOT to be used for urgent needs. For medical emergencies, dial 911. Now available from your iPhone and Android! Introducing Cirilo Aggarwal As a [facility] patient, I wanted to make you aware of our electronic visit tool called Cirilo Aggarwal. [facility] 24/7 allows you to connect within minutes with a medical provider 24 hours a day, seven days a week via a mobile device or tablet or logging into a secure website from your computer. You can access Cirilo Aggarwal from anywhere in the [place]. A virtual visit might be right for you when you have a simple condition and feel like you just dont want to get out of bed, or cant get away from work for an appointment, when your regular [facility] provider is not available (evenings, weekends or holidays), or when youre out of town and need minor care.   Electronic visits cost only $49 and if the [facility] 24/7 provider determines a prescription is needed to treat your condition, one can be electronically transmitted to a nearby pharmacy*. Please take a moment to enroll today if you have not already done so. The enrollment process is free and takes just a few minutes. To enroll, please download the "[employer]" 24/7 linda to your tablet or phone, or visit [email or web address] to enroll on your computer. And, as an [address] [place] [address] patient with a [employer] account, the results of your visits will be scanned into your electronic medical record and your primary care provider will be able to view the scanned results. We urge you to continue to see your regular "[employer]" provider for your ongoing medical care. And while your primary care provider may not be the one available when you seek a [employer] virtual visit, the peace of mind you get from getting a real diagnosis real time can be priceless. For more information on [employer], view our Frequently Asked Questions (FAQs) at [email or web address]. Sincerely, 
 
Sathya Briseno MD 
Chief Medical Officer [employer] *:  certain medications cannot be prescribed via [employer] Providers Seen During Your Hospitalization Provider Specialty Primary office phone Zaid Koroma MD Cardiology [phone] Your Primary Care Physician (PCP) Primary Care Physician Office Phone Office Fax Kisha Simmons [phone] You are allergic to the following Allergen Reactions Codeine Nausea and Vomiting Penicillins Rash As a child. Recent Documentation Height Weight BMI Smoking Status 1.829 m 86.2 kg 25.77 kg/m2 Former Smoker Emergency Contacts Name Discharge Info Relation Home Work Mobile Lisa Castellon DISCHARGE CAREGIVER [3] Spouse [3] [phone] Miguel A Castellon DISCHARGE CAREGIVER [3] Son [22]   [phone] Patient Belongings The following personal items are in your possession at time of discharge: 
     Visual Aid: Glasses Please provide this summary of care documentation to your next provider. Signatures-by signing, you are acknowledging that this After Visit Summary has been reviewed with you and you have received a copy. Patient Signature:  ____________________________________________________________ Date:  ____________________________________________________________  
  
[facility] Provider Signature:  ____________________________________________________________ Date:  ____________________________________________________________

## 2018-04-17 NOTE — IP AVS SNAPSHOT
[address] 
[phone] Patient: Moise Sylvester MRN: [id number] OHF:0/02/4673 A check kourtney indicates which time of day the medication should be taken. My Medications CONTINUE taking these medications Instructions Each Dose to Equal  
 Morning Noon Evening Bedtime  
 amiodarone 200 mg tablet Commonly known as:  CORDARONE Your last dose was: Your next dose is: Take 100 mg by mouth daily. 100 mg  
    
   
   
   
  
 aspirin delayed-release 81 mg tablet Your last dose was: Your next dose is: Take 81 mg by mouth daily. 81 mg  
    
   
   
   
  
 atorvastatin 40 mg tablet Commonly known as:  LIPITOR Your last dose was: Your next dose is: Take 40 mg by mouth daily. 40 mg  
    
   
   
   
  
 carvedilol 12.5 mg tablet Commonly known as:  Keaton Villalpando Your last dose was: Your next dose is: Take 1 Tab by mouth two (2) times daily (with meals). 12.5 mg PARoxetine 30 mg tablet Commonly known as:  PAXIL Your last dose was: Your next dose is: TAKE 1 TABLET BY MOUTH EVERY MORNING.

## 2018-04-30 PROBLEM — I95.1 ORTHOSTATIC HYPOTENSION: Status: ACTIVE | Noted: 2018-04-30

## 2018-07-10 ENCOUNTER — APPOINTMENT (OUTPATIENT)
Dept: GENERAL RADIOLOGY | Age: 73
DRG: 287 | End: 2018-07-10
Attending: EMERGENCY MEDICINE
Payer: MEDICARE

## 2018-07-10 ENCOUNTER — HOSPITAL ENCOUNTER (INPATIENT)
Age: 73
LOS: 4 days | Discharge: HOME | DRG: 287 | End: 2018-07-14
Attending: EMERGENCY MEDICINE | Admitting: INTERNAL MEDICINE
Payer: MEDICARE

## 2018-07-10 DIAGNOSIS — I47.20 VENTRICULAR TACHYCARDIA: Primary | ICD-10-CM

## 2018-07-10 LAB
ALBUMIN SERPL-MCNC: 3.6 G/DL (ref 3.5–5)
ALBUMIN/GLOB SERPL: 1 {RATIO} (ref 1.1–2.2)
ALP SERPL-CCNC: 82 U/L (ref 45–117)
ALT SERPL-CCNC: 37 U/L (ref 12–78)
ANION GAP BLD CALC-SCNC: 19 MMOL/L (ref 10–20)
ANION GAP SERPL CALC-SCNC: 11 MMOL/L (ref 5–15)
AST SERPL-CCNC: 22 U/L (ref 15–37)
BASOPHILS # BLD: 0.1 K/UL (ref 0–0.1)
BASOPHILS NFR BLD: 1 % (ref 0–1)
BILIRUB SERPL-MCNC: 0.7 MG/DL (ref 0.2–1)
BUN BLD-MCNC: 18 MG/DL (ref 9–20)
BUN SERPL-MCNC: 17 MG/DL (ref 6–20)
BUN/CREAT SERPL: 16 (ref 12–20)
CA-I BLD-MCNC: 1.09 MMOL/L (ref 1.12–1.32)
CALCIUM SERPL-MCNC: 8.3 MG/DL (ref 8.5–10.1)
CHLORIDE BLD-SCNC: 104 MMOL/L (ref 98–107)
CHLORIDE SERPL-SCNC: 107 MMOL/L (ref 97–108)
CO2 BLD-SCNC: 23 MMOL/L (ref 21–32)
CO2 SERPL-SCNC: 24 MMOL/L (ref 21–32)
CREAT BLD-MCNC: 1 MG/DL (ref 0.6–1.3)
CREAT SERPL-MCNC: 1.04 MG/DL (ref 0.7–1.3)
DIFFERENTIAL METHOD BLD: ABNORMAL
EOSINOPHIL # BLD: 0 K/UL (ref 0–0.4)
EOSINOPHIL NFR BLD: 0 % (ref 0–7)
ERYTHROCYTE [DISTWIDTH] IN BLOOD BY AUTOMATED COUNT: 13.3 % (ref 11.5–14.5)
ETHANOL SERPL-MCNC: 46 MG/DL
GLOBULIN SER CALC-MCNC: 3.6 G/DL (ref 2–4)
GLUCOSE BLD-MCNC: 105 MG/DL (ref 65–100)
GLUCOSE SERPL-MCNC: 107 MG/DL (ref 65–100)
HCT VFR BLD AUTO: 40 % (ref 36.6–50.3)
HCT VFR BLD CALC: 41 % (ref 36.6–50.3)
HGB BLD-MCNC: 13.2 G/DL (ref 12.1–17)
IMM GRANULOCYTES # BLD: 0 K/UL (ref 0–0.04)
IMM GRANULOCYTES NFR BLD AUTO: 0 % (ref 0–0.5)
LYMPHOCYTES # BLD: 4 K/UL (ref 0.8–3.5)
LYMPHOCYTES NFR BLD: 35 % (ref 12–49)
MAGNESIUM SERPL-MCNC: 1.9 MG/DL (ref 1.6–2.4)
MCH RBC QN AUTO: 32.8 PG (ref 26–34)
MCHC RBC AUTO-ENTMCNC: 33 G/DL (ref 30–36.5)
MCV RBC AUTO: 99.3 FL (ref 80–99)
MONOCYTES # BLD: 1.2 K/UL (ref 0–1)
MONOCYTES NFR BLD: 11 % (ref 5–13)
NEUTS SEG # BLD: 6.1 K/UL (ref 1.8–8)
NEUTS SEG NFR BLD: 53 % (ref 32–75)
NRBC # BLD: 0 K/UL (ref 0–0.01)
NRBC BLD-RTO: 0 PER 100 WBC
PLATELET # BLD AUTO: 190 K/UL (ref 150–400)
PMV BLD AUTO: 10.4 FL (ref 8.9–12.9)
POTASSIUM BLD-SCNC: 3.3 MMOL/L (ref 3.5–5.1)
POTASSIUM SERPL-SCNC: 3.4 MMOL/L (ref 3.5–5.1)
PROT SERPL-MCNC: 7.2 G/DL (ref 6.4–8.2)
RBC # BLD AUTO: 4.03 M/UL (ref 4.1–5.7)
SERVICE CMNT-IMP: ABNORMAL
SODIUM BLD-SCNC: 142 MMOL/L (ref 136–145)
SODIUM SERPL-SCNC: 142 MMOL/L (ref 136–145)
TROPONIN I SERPL-MCNC: 0.6 NG/ML
WBC # BLD AUTO: 11.4 K/UL (ref 4.1–11.1)

## 2018-07-10 PROCEDURE — 74011250637 HC RX REV CODE- 250/637: Performed by: EMERGENCY MEDICINE

## 2018-07-10 PROCEDURE — 71045 X-RAY EXAM CHEST 1 VIEW: CPT

## 2018-07-10 PROCEDURE — 96375 TX/PRO/DX INJ NEW DRUG ADDON: CPT

## 2018-07-10 PROCEDURE — 80047 BASIC METABLC PNL IONIZED CA: CPT

## 2018-07-10 PROCEDURE — 74011000250 HC RX REV CODE- 250: Performed by: INTERNAL MEDICINE

## 2018-07-10 PROCEDURE — 96365 THER/PROPH/DIAG IV INF INIT: CPT

## 2018-07-10 PROCEDURE — 96366 THER/PROPH/DIAG IV INF ADDON: CPT

## 2018-07-10 PROCEDURE — 99285 EMERGENCY DEPT VISIT HI MDM: CPT

## 2018-07-10 PROCEDURE — 96368 THER/DIAG CONCURRENT INF: CPT

## 2018-07-10 PROCEDURE — 96374 THER/PROPH/DIAG INJ IV PUSH: CPT

## 2018-07-10 PROCEDURE — 80307 DRUG TEST PRSMV CHEM ANLYZR: CPT | Performed by: EMERGENCY MEDICINE

## 2018-07-10 PROCEDURE — 74011250636 HC RX REV CODE- 250/636: Performed by: EMERGENCY MEDICINE

## 2018-07-10 PROCEDURE — 65610000006 HC RM INTENSIVE CARE

## 2018-07-10 PROCEDURE — 85025 COMPLETE CBC W/AUTO DIFF WBC: CPT | Performed by: EMERGENCY MEDICINE

## 2018-07-10 PROCEDURE — 74011250636 HC RX REV CODE- 250/636: Performed by: INTERNAL MEDICINE

## 2018-07-10 PROCEDURE — 80053 COMPREHEN METABOLIC PANEL: CPT | Performed by: EMERGENCY MEDICINE

## 2018-07-10 PROCEDURE — 36415 COLL VENOUS BLD VENIPUNCTURE: CPT | Performed by: EMERGENCY MEDICINE

## 2018-07-10 PROCEDURE — 74011000258 HC RX REV CODE- 258: Performed by: INTERNAL MEDICINE

## 2018-07-10 PROCEDURE — 93005 ELECTROCARDIOGRAM TRACING: CPT

## 2018-07-10 PROCEDURE — 83735 ASSAY OF MAGNESIUM: CPT | Performed by: EMERGENCY MEDICINE

## 2018-07-10 PROCEDURE — 74011000258 HC RX REV CODE- 258: Performed by: EMERGENCY MEDICINE

## 2018-07-10 PROCEDURE — 84484 ASSAY OF TROPONIN QUANT: CPT | Performed by: EMERGENCY MEDICINE

## 2018-07-10 RX ORDER — METOPROLOL TARTRATE 5 MG/5ML
5 INJECTION INTRAVENOUS ONCE
Status: COMPLETED | OUTPATIENT
Start: 2018-07-10 | End: 2018-07-10

## 2018-07-10 RX ORDER — LIDOCAINE HCL/PF 100 MG/5ML
100 SYRINGE (ML) INTRAVENOUS ONCE
Status: COMPLETED | OUTPATIENT
Start: 2018-07-10 | End: 2018-07-10

## 2018-07-10 RX ORDER — POTASSIUM CHLORIDE 29.8 MG/ML
20 INJECTION INTRAVENOUS EVERY 4 HOURS
Status: DISCONTINUED | OUTPATIENT
Start: 2018-07-11 | End: 2018-07-10 | Stop reason: ALTCHOICE

## 2018-07-10 RX ORDER — SODIUM CHLORIDE 9 MG/ML
25 INJECTION, SOLUTION INTRAVENOUS CONTINUOUS
Status: DISCONTINUED | OUTPATIENT
Start: 2018-07-10 | End: 2018-07-14 | Stop reason: HOSPADM

## 2018-07-10 RX ORDER — MAGNESIUM SULFATE HEPTAHYDRATE 40 MG/ML
2 INJECTION, SOLUTION INTRAVENOUS ONCE
Status: COMPLETED | OUTPATIENT
Start: 2018-07-10 | End: 2018-07-10

## 2018-07-10 RX ORDER — POTASSIUM CHLORIDE 7.45 MG/ML
10 INJECTION INTRAVENOUS
Status: DISPENSED | OUTPATIENT
Start: 2018-07-10 | End: 2018-07-11

## 2018-07-10 RX ORDER — LIDOCAINE HYDROCHLORIDE ANHYDROUS AND DEXTROSE MONOHYDRATE .8; 5 G/100ML; G/100ML
1 INJECTION, SOLUTION INTRAVENOUS CONTINUOUS
Status: DISCONTINUED | OUTPATIENT
Start: 2018-07-10 | End: 2018-07-12

## 2018-07-10 RX ORDER — ENOXAPARIN SODIUM 100 MG/ML
1 INJECTION SUBCUTANEOUS EVERY 12 HOURS
Status: COMPLETED | OUTPATIENT
Start: 2018-07-10 | End: 2018-07-11

## 2018-07-10 RX ORDER — POTASSIUM CHLORIDE 750 MG/1
60 TABLET, FILM COATED, EXTENDED RELEASE ORAL
Status: COMPLETED | OUTPATIENT
Start: 2018-07-10 | End: 2018-07-10

## 2018-07-10 RX ORDER — GUAIFENESIN 100 MG/5ML
324 LIQUID (ML) ORAL
Status: COMPLETED | OUTPATIENT
Start: 2018-07-10 | End: 2018-07-10

## 2018-07-10 RX ADMIN — LIDOCAINE HYDROCHLORIDE ANHYDROUS AND DEXTROSE MONOHYDRATE 2 MG/MIN: .8; 5 INJECTION, SOLUTION INTRAVENOUS at 23:09

## 2018-07-10 RX ADMIN — POTASSIUM CHLORIDE 10 MEQ: 10 INJECTION, SOLUTION INTRAVENOUS at 23:23

## 2018-07-10 RX ADMIN — SODIUM CHLORIDE 50 ML/HR: 900 INJECTION, SOLUTION INTRAVENOUS at 23:22

## 2018-07-10 RX ADMIN — AMIODARONE HYDROCHLORIDE 1 MG/MIN: 50 INJECTION, SOLUTION INTRAVENOUS at 21:00

## 2018-07-10 RX ADMIN — METOPROLOL TARTRATE 5 MG: 5 INJECTION, SOLUTION INTRAVENOUS at 22:58

## 2018-07-10 RX ADMIN — LIDOCAINE HYDROCHLORIDE 100 MG: 20 INJECTION, SOLUTION INTRAVENOUS at 23:01

## 2018-07-10 RX ADMIN — DEXTROSE MONOHYDRATE 150 MG: 5 INJECTION, SOLUTION INTRAVENOUS at 21:43

## 2018-07-10 RX ADMIN — MAGNESIUM SULFATE HEPTAHYDRATE 2 G: 40 INJECTION, SOLUTION INTRAVENOUS at 21:12

## 2018-07-10 RX ADMIN — POTASSIUM CHLORIDE 60 MEQ: 750 TABLET, EXTENDED RELEASE ORAL at 21:08

## 2018-07-10 RX ADMIN — ASPIRIN 81 MG 324 MG: 81 TABLET ORAL at 21:08

## 2018-07-10 RX ADMIN — AMIODARONE HYDROCHLORIDE 150 MG: 50 INJECTION, SOLUTION INTRAVENOUS at 20:45

## 2018-07-10 NOTE — IP AVS SNAPSHOT
Summary of Care Report The Summary of Care report has been created to help improve care coordination. Users with access to [employer] or [address] (Web-based application) may access additional patient information including the Discharge Summary. If you are not currently a [address] user and need more information, please call the number listed below in the [place] [address] section and ask to be connected with Medical Records. Facility Information Name Address Phone [address] [place] [id number] [phone] Patient Information Patient Name Sex  Breanna Hernández ([id number]) Male 1945 Discharge Information Admitting Provider Service Area Unit Elias Carpenter MD / [address]M7 Intensive Care / [phone] Discharge Provider Discharge Date/Time Discharge Disposition Destination (none) 2018 08:01 (Pending) AHR (none) Patient Language Language ENGLISH [13] Hospital Problems as of 2018  Reviewed: 2018  9:26 AM by Rebeca Holstein, MD  
  
  
  
 Class Noted - Resolved Last Modified POA Active Problems ASHD (arteriosclerotic heart disease)  2015 - Present 2018 by Rebeca Holstein, MD Yes Entered by [employer] Hypertension, benign  2015 - Present 2018 by Rebeca Holstein, MD Yes Entered by [employer] * (Principal)Ventricular tachycardia ([address]  7/10/2018 - Present 2018 by Rebeca Holstein, MD Unknown Entered by Elias Carpenter MD  
  Other hyperlipidemia  2018 - Present 2018 by Rebeca Holstein, MD Unknown   Entered by Rebeca Holstein, MD  
  
Non-Hospital Problems as of 2018  Reviewed: 2018  9:26 AM by Rebeca Holstein, MD  
  
  
  
 Class Noted - Resolved Last Modified Active Problems Ventricular premature beats  6/17/2015 - Present 3/4/2018 by Rudolph Lucero MD  
  Entered by [place] (ventricular tachycardia) [facility]  7/7/2016 - Present 3/4/2018 by Rudolph Lucero MD  
  Entered by Jermain Hurtado MD  
  Encounter for long-term (current) drug use  9/26/2016 - Present 9/26/2016 by Gilbert Brice MD  
  Entered by Gilbert Brice MD  
  Dizziness of unknown cause  3/4/2018 - Present 3/5/2018 by Gilbert Brice MD  
  Entered by Rudolph Lucero MD  
  Dizziness  3/4/2018 - Present 4/17/2018 by Hima Pryor MD  
  Entered by Rudolph Lucero MD  
  Other headache syndrome  3/5/2018 - Present 3/5/2018 by Gilbert Brice MD  
  Entered by Gilbert Brice MD  
  Orthostatic hypotension  4/30/2018 - Present 4/30/2018 by Gilbert Brice MD  
  Entered by Gilbert Brice MD  
  
You are allergic to the following Allergen Reactions Codeine Nausea and Vomiting Penicillins Rash As a child. Current Discharge Medication List  
  
START taking these medications Dose & Instructions Dispensing Information Comments  
 amLODIPine 10 mg tablet Commonly known as:  Santiago Miguel Dose:  10 mg Take 1 Tab by mouth daily. Quantity:  30 Tab Refills:  1  
   
 cloNIDine HCl 0.1 mg tablet Commonly known as:  CATAPRES Dose:  0.1 mg Take 1 Tab by mouth two (2) times a day. Quantity:  60 Tab Refills:  1  
   
 lisinopril 20 mg tablet Commonly known as:  Trinidad Reasons Dose:  20 mg Take 1 Tab by mouth daily. Quantity:  30 Tab Refills:  1  
   
 magnesium oxide 400 mg tablet Commonly known as:  MAG-OX Dose:  400 mg Take 1 Tab by mouth daily. Quantity:  30 Tab Refills:  1  
   
 potassium chloride SR 20 mEq tablet Commonly known as:  K-TAB Dose:  20 mEq Take 1 Tab by mouth daily. Quantity:  30 Tab Refills:  1 sotalol 80 mg tablet Commonly known as:  Colten Colla Dose:  80 mg Take 1 Tab by mouth every twelve (12) hours. Quantity:  60 Tab Refills:  3 CONTINUE these medications which have NOT CHANGED Dose & Instructions Dispensing Information Comments  
 aspirin delayed-release 81 mg tablet Dose:  81 mg Take 81 mg by mouth daily. Refills:  0  
   
 atorvastatin 40 mg tablet Commonly known as:  LIPITOR Dose:  40 mg Take 40 mg by mouth nightly. Refills:  0  
   
 REMA-Q PO Dose:  1 Cap Take 1 Cap by mouth daily. Refills:  0 PARoxetine 30 mg tablet Commonly known as:  PAXIL TAKE 1 TABLET BY MOUTH EVERY MORNING. Quantity:  30 Tab Refills:  PRN  
   
  
STOP taking these medications Comments  
 amiodarone 200 mg tablet Commonly known as:  CORDARONE  
   
   
 carvedilol 12.5 mg tablet Commonly known as:  Mirella Grier Current Immunizations Name Date Influenza Vaccine 11/7/2017, 10/1/2014 Influenza Vaccine Aarti Lanius) 11/24/2016 Pneumococcal Conjugate (PCV-13) 4/30/2018 Pneumococcal Vaccine (Unspecified Type) 7/1/2010 Follow-up Information Follow up With Details Comments Contact Info Shar Burton MD Schedule an appointment as soon as possible for a visit on 7/16/2018 Patient must shobha PCP to make appointment for 48 hrs after discharge and a second appointment 10 days after that. [facility] 
[phone] Naseem Diallo NP On 7/27/2018 Follow-up appointment on Friday July 27 @ 10:45 a.m. [place] [phone] Discharge Instructions None Chart Review Routing History Recipient Method Report Sent By Kari Cornell MD  
Phone: [phone] In Basket IP Auto Routed Notes [employer] [[id number]] 6/17/2015  4:15 PM 06/17/2015 Jose Cornell MD  
Phone: [phone] In Basket IP Auto Routed Notes [employer] [[id number]] 6/18/2015  7:50 AM 06/18/2015 Jose Luis Gallego MD  
Phone: [phone] In Basket IP Auto Routed Notes [employer] [[id number]] 6/18/2015  7:58 AM 06/18/2015 Jose Luis Gallego MD  
Phone: [phone] In Basket IP Auto Routed [employer] [[id number]] 6/19/2015  8:00 AM 06/19/2015 Jose Luis Gallego MD  
Phone: [phone] In Basket IP Auto Routed Trans MD Mendez Hyatt 7/8/2016  6:04 AM 07/08/2016 Malissa Munoz MD  
Phone: [phone] In Basket IP Auto Routed Notes MD Mendez Hyatt 7/11/2016  2:33 PM 07/11/2016 Kameron Saunders MD  
Phone: [phone] In Basket Notes Report Malissa Munoz MD [[id number]] 9/28/2016  7:56 PM 9/28/2016 Malissa Munoz MD  
Phone: [phone] In Basket IP Auto Routed Trans Kameron Saunders MD [[id number]] 1/23/2017  4:27 PM 01/23/2017 Adam Mack MD  
Fax: [phone] Phone: [phone] Fax Gray Juarez MD NOTES AUTO ROUTING REPORT MD Mendez Hyatt 1/23/2017  4:27 PM 01/23/2017 Kameron Saunders MD  
Phone: [phone] In Basket IP Auto Routed Trans Kameron Saunders MD [[id number]] 1/23/2017  4:27 PM 01/23/2017 Malissa Munoz MD  
Phone: [phone] In Basket IP Auto Routed Trans MD Mendez Hyatt 1/25/2017 10:04 AM 01/25/2017 Adam Mack MD  
Fax: [phone] Phone: [phone] Fax Gray Juarez MD NOTES AUTO ROUTING REPORT MD Mendez Hyatt 1/25/2017 10:04 AM 01/25/2017 Kameron Saunders MD  
Phone: [phone] In Basket IP Auto Routed Trans MD Mendez Hyatt 1/25/2017 10:04 AM 01/25/2017 Malissa Munoz MD  
Phone: [phone] In Basket IP Auto Routed Notes MD Mendez Hyatt 1/25/2017 10:13 AM 01/25/2017 Malissa Munoz MD  
Phone: [phone] In Basket IP Auto Routed Notes MD Santo Stahl 7/10/2018 10:36 PM 07/10/2018

## 2018-07-10 NOTE — IP AVS SNAPSHOT
[address] 
[phone] Patient: Lester Sands MRN: [id number] EYE:[birth year] A check kourtney indicates which time of day the medication should be taken. My Medications START taking these medications Instructions Each Dose to Equal  
 Morning Noon Evening Bedtime  
 amLODIPine 10 mg tablet Commonly known as:  Cherelle Duffy Your last dose was: Your next dose is: Take 1 Tab by mouth daily. 10 mg  
    
   
   
   
  
 cloNIDine HCl 0.1 mg tablet Commonly known as:  CATAPRES Your last dose was: Your next dose is: Take 1 Tab by mouth two (2) times a day. 0.1 mg  
    
   
   
   
  
 lisinopril 20 mg tablet Commonly known as:  Rosetta Naranjo Your last dose was: Your next dose is: Take 1 Tab by mouth daily. 20 mg  
    
   
   
   
  
 magnesium oxide 400 mg tablet Commonly known as:  MAG-OX Your last dose was: Your next dose is: Take 1 Tab by mouth daily. 400 mg  
    
   
   
   
  
 potassium chloride SR 20 mEq tablet Commonly known as:  K-TAB Your last dose was: Your next dose is: Take 1 Tab by mouth daily. 20 mEq  
    
   
   
   
  
 sotalol 80 mg tablet Commonly known as:  Jeremías Pozo Your last dose was: Your next dose is: Take 1 Tab by mouth every twelve (12) hours. 80 mg CONTINUE taking these medications Instructions Each Dose to Equal  
 Morning Noon Evening Bedtime  
 aspirin delayed-release 81 mg tablet Your last dose was: Your next dose is: Take 81 mg by mouth daily. 81 mg  
    
   
   
   
  
 atorvastatin 40 mg tablet Commonly known as:  LIPITOR Your last dose was: Your next dose is: Take 40 mg by mouth nightly.   
 40 mg  
    
   
 REMA-Q PO Your last dose was: Your next dose is: Take 1 Cap by mouth daily. 1 Cap PARoxetine 30 mg tablet Commonly known as:  PAXIL Your last dose was: Your next dose is: TAKE 1 TABLET BY MOUTH EVERY MORNING.  
     
   
   
   
  
  
STOP taking these medications   
 amiodarone 200 mg tablet Commonly known as:  CORDARONE  
   
  
 carvedilol 12.5 mg tablet Commonly known as:  Iggy Hensley Where to Get Your Medications Information on where to get these meds will be given to you by the nurse or doctor. ! Ask your nurse or doctor about these medications  
  amLODIPine 10 mg tablet  
 cloNIDine HCl 0.1 mg tablet  
 lisinopril 20 mg tablet  
 magnesium oxide 400 mg tablet  
 potassium chloride SR 20 mEq tablet  
 sotalol 80 mg tablet

## 2018-07-10 NOTE — IP AVS SNAPSHOT
[address] [facility] 13 
[phone] Patient: Apollo Hicks MRN: [id number] [facility]:3/61/1674 About your hospitalization You were admitted on:  July 10, 2018 You last received care in the:  [address] [place] [address] You were discharged on:  July 14, 2018 Why you were hospitalized Your primary diagnosis was:  Ventricular Tachycardia (Hcc) Your diagnoses also included:  Ashd (Arteriosclerotic Heart Disease), Hypertension, Benign, Other Hyperlipidemia Follow-up Information Follow up With Details Comments Contact Info Karen Salmeron MD Schedule an appointment as soon as possible for a visit on 7/16/2018 Patient must shobha PCP to make appointment for 48 hrs after discharge and a second appointment 10 days after that. [facility] 
[phone] Diana Gordon NP On 7/27/2018 Follow-up appointment on Friday July 27 @ 10:45 a.m. [address] [address] Discharge Orders None A check kourtney indicates which time of day the medication should be taken. My Medications START taking these medications Instructions Each Dose to Equal  
 Morning Noon Evening Bedtime  
 amLODIPine 10 mg tablet Commonly known as:  Love Breach Your last dose was: Your next dose is: Take 1 Tab by mouth daily. 10 mg  
    
   
   
   
  
 cloNIDine HCl 0.1 mg tablet Commonly known as:  CATAPRES Your last dose was: Your next dose is: Take 1 Tab by mouth two (2) times a day. 0.1 mg  
    
   
   
   
  
 lisinopril 20 mg tablet Commonly known as:  Nan Escort Your last dose was: Your next dose is: Take 1 Tab by mouth daily. 20 mg  
    
   
   
   
  
 magnesium oxide 400 mg tablet Commonly known as:  MAG-OX Your last dose was: Your next dose is: Take 1 Tab by mouth daily. 400 mg  
    
   
   
   
  
 potassium chloride SR 20 mEq tablet Commonly known as:  K-TAB Your last dose was: Your next dose is: Take 1 Tab by mouth daily. 20 mEq  
    
   
   
   
  
 sotalol 80 mg tablet Commonly known as:  Colten Colla Your last dose was: Your next dose is: Take 1 Tab by mouth every twelve (12) hours. 80 mg CONTINUE taking these medications Instructions Each Dose to Equal  
 Morning Noon Evening Bedtime  
 aspirin delayed-release 81 mg tablet Your last dose was: Your next dose is: Take 81 mg by mouth daily. 81 mg  
    
   
   
   
  
 atorvastatin 40 mg tablet Commonly known as:  LIPITOR Your last dose was: Your next dose is: Take 40 mg by mouth nightly. 40 mg  
    
   
   
   
  
 REMA-Q PO Your last dose was: Your next dose is: Take 1 Cap by mouth daily. 1 Cap PARoxetine 30 mg tablet Commonly known as:  PAXIL Your last dose was: Your next dose is: TAKE 1 TABLET BY MOUTH EVERY MORNING.  
     
   
   
   
  
  
STOP taking these medications   
 amiodarone 200 mg tablet Commonly known as:  CORDARONE  
   
  
 carvedilol 12.5 mg tablet Commonly known as:  Mirella Grier Where to Get Your Medications Information on where to get these meds will be given to you by the nurse or doctor. ! Ask your nurse or doctor about these medications  
  amLODIPine 10 mg tablet  
 cloNIDine HCl 0.1 mg tablet  
 lisinopril 20 mg tablet  
 magnesium oxide 400 mg tablet  
 potassium chloride SR 20 mEq tablet  
 sotalol 80 mg tablet Discharge Instructions None Introducing [facility] [place] [facility]! Dear Romario Yanes: 
Thank you for requesting a [employer] account.   Our records indicate that you already have an active ownCloud account. You can access your account anytime at [email or web address] Did you know that you can access your hospital and ER discharge instructions at any time in ownCloud? You can also review all of your test results from your hospital stay or ER visit. Additional Information If you have questions, please visit the Frequently Asked Questions section of the ownCloud website at [email or web address] Remember, ownCloud is NOT to be used for urgent needs. For medical emergencies, dial 911. Now available from your iPhone and Android! Introducing Cirilo Aggarwal As a [facility] patient, I wanted to make you aware of our electronic visit tool called Cirilo Aggarwal. [employer] 24/7 allows you to connect within minutes with a medical provider 24 hours a day, seven days a week via a mobile device or tablet or logging into a secure website from your computer. You can access Cirilo b3 bio from anywhere in the [place]. A virtual visit might be right for you when you have a simple condition and feel like you just dont want to get out of bed, or cant get away from work for an appointment, when your regular [employer] provider is not available (evenings, weekends or holidays), or when youre out of town and need minor care. Electronic visits cost only $49 and if the REH/7 provider determines a prescription is needed to treat your condition, one can be electronically transmitted to a nearby pharmacy*. Please take a moment to enroll today if you have not already done so. The enrollment process is free and takes just a few minutes. To enroll, please download the [facility] 24/[employer] linda to your tablet or phone, or visit [email or web address] to enroll on your computer.    
And, as an [address] [place] [address] patient with a [employer] account, the results of your visits will be scanned into your electronic medical record and your primary care provider will be able to view the scanned results. We urge you to continue to see your regular [employer] provider for your ongoing medical care. And while your primary care provider may not be the one available when you seek a Cirilo Aggarwal virtual visit, the peace of mind you get from getting a real diagnosis real time can be priceless. For more information on Cirilo Moralesfin, view our Frequently Asked Questions (FAQs) at [email or web address]. Sincerely, 
 
Taisha Singer MD 
Chief Medical Officer [address] *:  certain medications cannot be prescribed via [employer] Unresulted Labs-Please follow up with your PCP about these lab tests Order Current Status EKG, 12 LEAD, INITIAL Preliminary result Providers Seen During Your Hospitalization Provider Specialty Primary office phone Ashvin Harden DO Emergency Medicine [phone] Lianne West MD Cardiology [phone] Your Primary Care Physician (PCP) Primary Care Physician Office Phone Office Fax Aba Jones [phone] You are allergic to the following Allergen Reactions Codeine Nausea and Vomiting Penicillins Rash As a child. Recent Documentation Height Weight BMI Smoking Status 1.829 m 86 kg 25.71 kg/m2 Former Smoker Emergency Contacts Name Discharge Info Relation Home Work Mobile Lisa Castellon DISCHARGE CAREGIVER [3] Spouse [3] [phone] Miguel A Castellon DISCHARGE CAREGIVER [3] Son [22]   [phone] Patient Belongings The following personal items are in your possession at time of discharge: 
  Dental Appliances: None  Visual Aid: Glasses, With patient Please provide this summary of care documentation to your next provider. Signatures-by signing, you are acknowledging that this After Visit Summary has been reviewed with you and you have received a copy. Patient Signature:  ____________________________________________________________ Date:  ____________________________________________________________  
  
Georgiana Ramonita Provider Signature:  ____________________________________________________________ Date:  ____________________________________________________________

## 2018-07-11 PROBLEM — E78.49 OTHER HYPERLIPIDEMIA: Status: ACTIVE | Noted: 2018-07-11

## 2018-07-11 LAB
ANION GAP SERPL CALC-SCNC: 10 MMOL/L (ref 5–15)
ATRIAL RATE: 64 BPM
ATRIAL RATE: 75 BPM
BUN SERPL-MCNC: 15 MG/DL (ref 6–20)
BUN/CREAT SERPL: 17 (ref 12–20)
CALCIUM SERPL-MCNC: 8 MG/DL (ref 8.5–10.1)
CALCULATED P AXIS, ECG09: 8 DEGREES
CALCULATED R AXIS, ECG10: -26 DEGREES
CALCULATED R AXIS, ECG10: 19 DEGREES
CALCULATED T AXIS, ECG11: 40 DEGREES
CALCULATED T AXIS, ECG11: 50 DEGREES
CHLORIDE SERPL-SCNC: 106 MMOL/L (ref 97–108)
CO2 SERPL-SCNC: 23 MMOL/L (ref 21–32)
CREAT SERPL-MCNC: 0.88 MG/DL (ref 0.7–1.3)
DIAGNOSIS, 93000: NORMAL
DIAGNOSIS, 93000: NORMAL
GLUCOSE SERPL-MCNC: 113 MG/DL (ref 65–100)
MAGNESIUM SERPL-MCNC: 2.4 MG/DL (ref 1.6–2.4)
P-R INTERVAL, ECG05: 196 MS
POTASSIUM SERPL-SCNC: 3.9 MMOL/L (ref 3.5–5.1)
Q-T INTERVAL, ECG07: 412 MS
Q-T INTERVAL, ECG07: 476 MS
QRS DURATION, ECG06: 102 MS
QRS DURATION, ECG06: 108 MS
QTC CALCULATION (BEZET), ECG08: 484 MS
QTC CALCULATION (BEZET), ECG08: 491 MS
SODIUM SERPL-SCNC: 139 MMOL/L (ref 136–145)
TROPONIN I SERPL-MCNC: 2.46 NG/ML
TROPONIN I SERPL-MCNC: 3.37 NG/ML
TROPONIN I SERPL-MCNC: 3.63 NG/ML
VENTRICULAR RATE, ECG03: 64 BPM
VENTRICULAR RATE, ECG03: 83 BPM

## 2018-07-11 PROCEDURE — 74011250636 HC RX REV CODE- 250/636: Performed by: INTERNAL MEDICINE

## 2018-07-11 PROCEDURE — 74011636320 HC RX REV CODE- 636/320: Performed by: INTERNAL MEDICINE

## 2018-07-11 PROCEDURE — 65610000006 HC RM INTENSIVE CARE

## 2018-07-11 PROCEDURE — B2111ZZ FLUOROSCOPY OF MULTIPLE CORONARY ARTERIES USING LOW OSMOLAR CONTRAST: ICD-10-PCS | Performed by: INTERNAL MEDICINE

## 2018-07-11 PROCEDURE — 84484 ASSAY OF TROPONIN QUANT: CPT | Performed by: INTERNAL MEDICINE

## 2018-07-11 PROCEDURE — 74011000250 HC RX REV CODE- 250: Performed by: INTERNAL MEDICINE

## 2018-07-11 PROCEDURE — 77030004533 HC CATH ANGI DX IMP BSC -B

## 2018-07-11 PROCEDURE — C1769 GUIDE WIRE: HCPCS

## 2018-07-11 PROCEDURE — 99152 MOD SED SAME PHYS/QHP 5/>YRS: CPT

## 2018-07-11 PROCEDURE — 74011250636 HC RX REV CODE- 250/636: Performed by: EMERGENCY MEDICINE

## 2018-07-11 PROCEDURE — 93306 TTE W/DOPPLER COMPLETE: CPT

## 2018-07-11 PROCEDURE — C1894 INTRO/SHEATH, NON-LASER: HCPCS

## 2018-07-11 PROCEDURE — 77030010221 HC SPLNT WR POS TELE -B

## 2018-07-11 PROCEDURE — 77030013715 HC INFL SYS MRTM -B

## 2018-07-11 PROCEDURE — 77030015766

## 2018-07-11 PROCEDURE — 83735 ASSAY OF MAGNESIUM: CPT | Performed by: INTERNAL MEDICINE

## 2018-07-11 PROCEDURE — 74011250637 HC RX REV CODE- 250/637: Performed by: INTERNAL MEDICINE

## 2018-07-11 PROCEDURE — 80048 BASIC METABOLIC PNL TOTAL CA: CPT | Performed by: INTERNAL MEDICINE

## 2018-07-11 PROCEDURE — 77030004532 HC CATH ANGI DX IMP BSC -A

## 2018-07-11 PROCEDURE — 4A023N7 MEASUREMENT OF CARDIAC SAMPLING AND PRESSURE, LEFT HEART, PERCUTANEOUS APPROACH: ICD-10-PCS | Performed by: INTERNAL MEDICINE

## 2018-07-11 PROCEDURE — 77030013744

## 2018-07-11 PROCEDURE — C1725 CATH, TRANSLUMIN NON-LASER: HCPCS

## 2018-07-11 PROCEDURE — 36415 COLL VENOUS BLD VENIPUNCTURE: CPT | Performed by: INTERNAL MEDICINE

## 2018-07-11 PROCEDURE — 93005 ELECTROCARDIOGRAM TRACING: CPT

## 2018-07-11 PROCEDURE — 77030019569 HC BND COMPR RAD TERU -B

## 2018-07-11 RX ORDER — NITROGLYCERIN 20 MG/100ML
0-200 INJECTION INTRAVENOUS
Status: DISCONTINUED | OUTPATIENT
Start: 2018-07-11 | End: 2018-07-13

## 2018-07-11 RX ORDER — ATROPINE SULFATE 0.1 MG/ML
0.4 INJECTION INTRAVENOUS AS NEEDED
Status: DISCONTINUED | OUTPATIENT
Start: 2018-07-11 | End: 2018-07-11

## 2018-07-11 RX ORDER — SODIUM CHLORIDE 0.9 % (FLUSH) 0.9 %
5-10 SYRINGE (ML) INJECTION EVERY 8 HOURS
Status: DISCONTINUED | OUTPATIENT
Start: 2018-07-11 | End: 2018-07-14 | Stop reason: HOSPADM

## 2018-07-11 RX ORDER — SODIUM CHLORIDE 9 MG/ML
1.5 INJECTION, SOLUTION INTRAVENOUS CONTINUOUS
Status: DISCONTINUED | OUTPATIENT
Start: 2018-07-11 | End: 2018-07-11 | Stop reason: HOSPADM

## 2018-07-11 RX ORDER — VERAPAMIL HYDROCHLORIDE 2.5 MG/ML
2.5-5 INJECTION, SOLUTION INTRAVENOUS
Status: DISCONTINUED | OUTPATIENT
Start: 2018-07-11 | End: 2018-07-11

## 2018-07-11 RX ORDER — ENOXAPARIN SODIUM 100 MG/ML
40 INJECTION SUBCUTANEOUS EVERY 24 HOURS
Status: DISCONTINUED | OUTPATIENT
Start: 2018-07-11 | End: 2018-07-11

## 2018-07-11 RX ORDER — METOPROLOL TARTRATE 25 MG/1
25 TABLET, FILM COATED ORAL 2 TIMES DAILY
Status: DISCONTINUED | OUTPATIENT
Start: 2018-07-11 | End: 2018-07-13

## 2018-07-11 RX ORDER — POTASSIUM CHLORIDE 7.45 MG/ML
10 INJECTION INTRAVENOUS ONCE
Status: COMPLETED | OUTPATIENT
Start: 2018-07-11 | End: 2018-07-11

## 2018-07-11 RX ORDER — ENOXAPARIN SODIUM 100 MG/ML
1 INJECTION SUBCUTANEOUS EVERY 12 HOURS
Status: DISPENSED | OUTPATIENT
Start: 2018-07-11 | End: 2018-07-11

## 2018-07-11 RX ORDER — SODIUM CHLORIDE 9 MG/ML
3 INJECTION, SOLUTION INTRAVENOUS CONTINUOUS
Status: DISCONTINUED | OUTPATIENT
Start: 2018-07-11 | End: 2018-07-11 | Stop reason: HOSPADM

## 2018-07-11 RX ORDER — ATORVASTATIN CALCIUM 40 MG/1
40 TABLET, FILM COATED ORAL
Status: DISCONTINUED | OUTPATIENT
Start: 2018-07-11 | End: 2018-07-14 | Stop reason: HOSPADM

## 2018-07-11 RX ORDER — PAROXETINE HYDROCHLORIDE 20 MG/1
30 TABLET, FILM COATED ORAL DAILY
Status: DISCONTINUED | OUTPATIENT
Start: 2018-07-11 | End: 2018-07-14 | Stop reason: HOSPADM

## 2018-07-11 RX ORDER — AMLODIPINE BESYLATE 5 MG/1
5 TABLET ORAL DAILY
Status: DISCONTINUED | OUTPATIENT
Start: 2018-07-11 | End: 2018-07-13

## 2018-07-11 RX ORDER — CLONIDINE HYDROCHLORIDE 0.1 MG/1
0.1 TABLET ORAL
Status: DISCONTINUED | OUTPATIENT
Start: 2018-07-11 | End: 2018-07-11

## 2018-07-11 RX ORDER — CLONIDINE HYDROCHLORIDE 0.1 MG/1
0.1 TABLET ORAL
Status: DISCONTINUED | OUTPATIENT
Start: 2018-07-11 | End: 2018-07-13

## 2018-07-11 RX ORDER — ACETAMINOPHEN 325 MG/1
650 TABLET ORAL
Status: DISCONTINUED | OUTPATIENT
Start: 2018-07-11 | End: 2018-07-14 | Stop reason: HOSPADM

## 2018-07-11 RX ORDER — HEPARIN SODIUM 200 [USP'U]/100ML
2000 INJECTION, SOLUTION INTRAVENOUS ONCE
Status: COMPLETED | OUTPATIENT
Start: 2018-07-11 | End: 2018-07-11

## 2018-07-11 RX ORDER — SODIUM CHLORIDE 0.9 % (FLUSH) 0.9 %
10 SYRINGE (ML) INJECTION AS NEEDED
Status: DISCONTINUED | OUTPATIENT
Start: 2018-07-11 | End: 2018-07-11 | Stop reason: HOSPADM

## 2018-07-11 RX ORDER — SODIUM CHLORIDE 0.9 % (FLUSH) 0.9 %
SYRINGE (ML) INJECTION
Status: COMPLETED
Start: 2018-07-11 | End: 2018-07-11

## 2018-07-11 RX ORDER — ASPIRIN 81 MG/1
81 TABLET ORAL DAILY
Status: DISCONTINUED | OUTPATIENT
Start: 2018-07-11 | End: 2018-07-14 | Stop reason: HOSPADM

## 2018-07-11 RX ORDER — FENTANYL CITRATE 50 UG/ML
25-200 INJECTION, SOLUTION INTRAMUSCULAR; INTRAVENOUS
Status: DISCONTINUED | OUTPATIENT
Start: 2018-07-11 | End: 2018-07-11

## 2018-07-11 RX ORDER — HEPARIN SODIUM 1000 [USP'U]/ML
5000 INJECTION, SOLUTION INTRAVENOUS; SUBCUTANEOUS AS NEEDED
Status: DISCONTINUED | OUTPATIENT
Start: 2018-07-11 | End: 2018-07-11

## 2018-07-11 RX ORDER — SODIUM CHLORIDE 0.9 % (FLUSH) 0.9 %
5-10 SYRINGE (ML) INJECTION AS NEEDED
Status: DISCONTINUED | OUTPATIENT
Start: 2018-07-11 | End: 2018-07-14 | Stop reason: HOSPADM

## 2018-07-11 RX ORDER — MIDAZOLAM HYDROCHLORIDE 1 MG/ML
1-10 INJECTION, SOLUTION INTRAMUSCULAR; INTRAVENOUS
Status: DISCONTINUED | OUTPATIENT
Start: 2018-07-11 | End: 2018-07-11

## 2018-07-11 RX ORDER — LIDOCAINE HYDROCHLORIDE 10 MG/ML
5-30 INJECTION INFILTRATION; PERINEURAL AS NEEDED
Status: DISCONTINUED | OUTPATIENT
Start: 2018-07-11 | End: 2018-07-11

## 2018-07-11 RX ADMIN — IOPAMIDOL 61 ML: 755 INJECTION, SOLUTION INTRAVENOUS at 16:15

## 2018-07-11 RX ADMIN — NITROGLYCERIN 200 MCG: 5 INJECTION, SOLUTION INTRAVENOUS at 15:59

## 2018-07-11 RX ADMIN — FENTANYL CITRATE 25 MCG: 50 INJECTION, SOLUTION INTRAMUSCULAR; INTRAVENOUS at 15:45

## 2018-07-11 RX ADMIN — METOPROLOL TARTRATE 25 MG: 25 TABLET ORAL at 08:35

## 2018-07-11 RX ADMIN — SODIUM CHLORIDE 3 ML/KG/HR: 900 INJECTION, SOLUTION INTRAVENOUS at 15:30

## 2018-07-11 RX ADMIN — HEPARIN SODIUM 1000 UNITS: 1000 INJECTION, SOLUTION INTRAVENOUS; SUBCUTANEOUS at 15:44

## 2018-07-11 RX ADMIN — ENOXAPARIN SODIUM 100 MG: 100 INJECTION SUBCUTANEOUS at 00:04

## 2018-07-11 RX ADMIN — Medication 10 ML: at 21:23

## 2018-07-11 RX ADMIN — HEPARIN SODIUM 2000 UNITS: 200 INJECTION, SOLUTION INTRAVENOUS at 15:37

## 2018-07-11 RX ADMIN — ACETAMINOPHEN 650 MG: 325 TABLET ORAL at 13:25

## 2018-07-11 RX ADMIN — ATORVASTATIN CALCIUM 40 MG: 40 TABLET, FILM COATED ORAL at 22:00

## 2018-07-11 RX ADMIN — AMLODIPINE BESYLATE 5 MG: 5 TABLET ORAL at 08:35

## 2018-07-11 RX ADMIN — NITROGLYCERIN 10 MCG/MIN: 20 INJECTION INTRAVENOUS at 07:43

## 2018-07-11 RX ADMIN — POTASSIUM CHLORIDE 10 MEQ: 10 INJECTION, SOLUTION INTRAVENOUS at 00:28

## 2018-07-11 RX ADMIN — FENTANYL CITRATE 25 MCG: 50 INJECTION, SOLUTION INTRAMUSCULAR; INTRAVENOUS at 15:37

## 2018-07-11 RX ADMIN — LIDOCAINE HYDROCHLORIDE ANHYDROUS AND DEXTROSE MONOHYDRATE 2 MG/MIN: .8; 5 INJECTION, SOLUTION INTRAVENOUS at 13:14

## 2018-07-11 RX ADMIN — CLONIDINE HYDROCHLORIDE 0.1 MG: 0.1 TABLET ORAL at 09:57

## 2018-07-11 RX ADMIN — MIDAZOLAM HYDROCHLORIDE 1 MG: 1 INJECTION, SOLUTION INTRAMUSCULAR; INTRAVENOUS at 15:45

## 2018-07-11 RX ADMIN — HEPARIN SODIUM 4000 UNITS: 1000 INJECTION, SOLUTION INTRAVENOUS; SUBCUTANEOUS at 16:15

## 2018-07-11 RX ADMIN — POTASSIUM CHLORIDE 10 MEQ: 10 INJECTION, SOLUTION INTRAVENOUS at 04:16

## 2018-07-11 RX ADMIN — SODIUM CHLORIDE 1.5 ML/KG/HR: 900 INJECTION, SOLUTION INTRAVENOUS at 16:30

## 2018-07-11 RX ADMIN — Medication 10 ML: at 14:00

## 2018-07-11 RX ADMIN — POTASSIUM CHLORIDE 10 MEQ: 10 INJECTION, SOLUTION INTRAVENOUS at 02:02

## 2018-07-11 RX ADMIN — Medication 10 ML: at 18:00

## 2018-07-11 RX ADMIN — LIDOCAINE HYDROCHLORIDE 5 ML: 10 INJECTION, SOLUTION INFILTRATION; PERINEURAL at 15:42

## 2018-07-11 RX ADMIN — AMIODARONE HYDROCHLORIDE 0.5 MG/MIN: 50 INJECTION, SOLUTION INTRAVENOUS at 04:20

## 2018-07-11 RX ADMIN — VERAPAMIL HYDROCHLORIDE 5 MG: 2.5 INJECTION, SOLUTION INTRAVENOUS at 15:44

## 2018-07-11 RX ADMIN — ACETAMINOPHEN 650 MG: 325 TABLET ORAL at 21:15

## 2018-07-11 RX ADMIN — NITROGLYCERIN 15 MCG/MIN: 20 INJECTION INTRAVENOUS at 20:57

## 2018-07-11 RX ADMIN — MIDAZOLAM HYDROCHLORIDE 2 MG: 1 INJECTION, SOLUTION INTRAMUSCULAR; INTRAVENOUS at 15:37

## 2018-07-11 RX ADMIN — PAROXETINE HYDROCHLORIDE 30 MG: 20 TABLET, FILM COATED ORAL at 08:35

## 2018-07-11 RX ADMIN — ASPIRIN 81 MG: 81 TABLET, COATED ORAL at 08:35

## 2018-07-11 NOTE — PROGRESS NOTES
Visited with Pt in Room ICU 7108. Pt was laying in bed resting, with wife at his bed side. Pt state that he was waiting for his  to visit, because he was a little upset. Gave the prayer of assurance and the availability of the . Rev. Tobin Ervin.  [facility] Intern [employer]

## 2018-07-11 NOTE — PROGRESS NOTES
Spiritual Care Assessment/Progress Note  [facility]      NAME: Beena Wallace      MRN: [id number]  AGE: 74 y.o. SEX: male  Mormonism Affiliation: Samaritan   Language: English     7/10/2018     Total Time (in minutes): 20     Spiritual Assessment begun in [address] through conversation with:         []Patient        [x] Family    [] Friend(s)        Reason for Consult: Emergency Department visit     Spiritual beliefs: (Please include comment if needed)     [] Identifies with a declan tradition:         [] Supported by a declan community:            [] Claims no spiritual orientation:           [] Seeking spiritual identity:                [] Adheres to an individual form of spirituality:           [x] Not able to assess:                           Identified resources for coping:      [x] Prayer                               [] Music                  [] Guided Imagery     [] Family/friends                 [] Pet visits     [] Devotional reading                         [] Unknown     [] Other:                                            Interventions offered during this visit: (See comments for more details)          Family/Friend(s): Affirmation of emotions/emotional suffering, Prayer (assurance of)     Plan of Care:     [] Support spiritual and/or cultural needs    [] Support AMD and/or advance care planning process      [] Support grieving process   [] Coordinate Rites and/or Rituals    [] Coordination with community clergy   [] No spiritual needs identified at this time   [] Detailed Plan of Care below (See Comments)  [] Make referral to Music Therapy  [] Make referral to Pet Therapy     [] Make referral to Addiction services  [] Make referral to [facility]  [] Make referral to Spiritual Care Partner  [] No future visits requested        [x] Follow up visits as needed     Comments: Initial spiritual assessment in ED Room 16. Patient is In the room with wife and doctor, son was outside of room. Son spoke of father health issues. Provided prayer of assurance. Lupis Borne of  Availability. Rev. Iram Lima.  [facility] Intern [facility]

## 2018-07-11 NOTE — PROGRESS NOTES
TRANSFER - IN REPORT:    Verbal report received from [facility]) on Cayetano Pencil  being received from Cath lab (unit) for routine post - op      Report consisted of patients Situation, Background, Assessment and   Recommendations(SBAR). Information from the following report(s) SBAR, Kardex, ED Summary, Procedure Summary, Intake/Output, MAR and Recent Results was reviewed with the receiving nurse. Opportunity for questions and clarification was provided. Assessment completed upon patients arrival to unit and care assumed.

## 2018-07-11 NOTE — PROGRESS NOTES
Reason for Admission:  AICD fired x 10 times                    RRAT Score:      12               Plan for utilizing home health:   Not at this time                       Likelihood of Readmission:  low                         Transition of Care Plan:    Home    Patient is independent. Patient last saw his PCP on 4/30 and uses [facility]. No previous home health or equipment needs. Care management will follow for potential discharge planning needs. Kellie Duggan RN,CRM            Care Management Interventions  PCP Verified by CM:  Yes (Dr Clari Cruz)  Palliative Care Criteria Met (RRAT>21 & CHF Dx)?: No  MyChart Signup: Yes  Discharge Durable Medical Equipment: No  Physical Therapy Consult: No  Occupational Therapy Consult: No  Speech Therapy Consult: No  Current Support Network: Lives with Spouse (wife Hamilton Riedel [phone])  Confirm Follow Up Transport: Family  Discharge Location  Discharge Placement: Home

## 2018-07-11 NOTE — PROGRESS NOTES
1525 -   Cardiac Cath Lab Procedure Area Arrival Note:    Payal Harris arrived to Cardiac Cath Lab, Procedure Area. Patient identifiers verified with NAME and DATE OF BIRTH. Procedure verified with patient. Consent forms verified. Allergies verified. Patient informed of procedure and plan of care. Questions answered with review. Patient voiced understanding of procedure and plan of care. Patient on cardiac monitor, non-invasive blood pressure, SPO2 monitor. Placed on O2 @ 2 lpm via NC.  IV of NS on pump at 266 ml/hr. Patient status doing well without problems. Patient is A&Ox 4. Patient reports no discomfort at this time. Patient medicated during procedure with orders obtained and verified by Dr. Chana Zaman. Refer to patients Cardiac Cath Lab PROCEDURE REPORT for vital signs, assessment, status, and response during procedure, printed at end of case. Printed report on chart or scanned into chart. 4715 - TRANSFER - OUT REPORT:    Verbal report given to Dianna HAIDER(name) on Payal Harris  being transferred to ICU(unit) for routine progression of care       Report consisted of patients Situation, Background, Assessment and   Recommendations(SBAR). Information from the following report(s) Procedure Summary, MAR and Cardiac Rhythm NSR was reviewed with the receiving nurse. Lines:   Peripheral IV 07/10/18 Left Antecubital (Active)   Site Assessment Clean, dry, & intact 7/11/2018 12:00 PM   Phlebitis Assessment 0 7/11/2018 12:00 PM   Infiltration Assessment 0 7/11/2018 12:00 PM   Dressing Status Clean, dry, & intact 7/11/2018 12:00 PM   Dressing Type Tape;Transparent 7/11/2018 12:00 PM   Hub Color/Line Status Green; Infusing 7/11/2018 12:00 PM   Action Taken Open ports on tubing capped 7/11/2018  4:52 AM   Alcohol Cap Used Yes 7/11/2018 12:00 PM       Peripheral IV 07/10/18 Right Antecubital (Active)   Site Assessment Clean, dry, & intact 7/11/2018 12:00 PM   Phlebitis Assessment 0 7/11/2018 12:00 PM Infiltration Assessment 0 7/11/2018 12:00 PM   Dressing Status Clean, dry, & intact 7/11/2018 12:00 PM   Dressing Type Tape;Transparent 7/11/2018 12:00 PM   Hub Color/Line Status Pink; Infusing 7/11/2018 12:00 PM   Action Taken Open ports on tubing capped 7/11/2018  4:52 AM   Alcohol Cap Used Yes 7/11/2018 12:00 PM       Peripheral IV 07/10/18 Right Arm (Active)   Site Assessment Clean, dry, & intact 7/11/2018 12:00 PM   Phlebitis Assessment 0 7/11/2018 12:00 PM   Infiltration Assessment 0 7/11/2018 12:00 PM   Dressing Status Clean, dry, & intact 7/11/2018 12:00 PM   Dressing Type Tape;Transparent 7/11/2018 12:00 PM   Hub Color/Line Status Pink; Infusing 7/11/2018 12:00 PM   Action Taken Open ports on tubing capped 7/11/2018  4:52 AM   Alcohol Cap Used Yes 7/11/2018 12:00 PM        Opportunity for questions and clarification was provided.       Patient transported with:   Monitor  Registered Nurse

## 2018-07-11 NOTE — ED PROVIDER NOTES
HPI Comments: 74 y.o. male with past medical history significant for CAD, Hypertension, GERD, Cancer, and orthostatic hypotension who presents via EMS with chief complaint of ICD discharge and VT. Patient was at home when his ICD shocked him 10x with Vtach with pulse. Patient denies CP or any symptoms prior to ICD shock. Per EMS, en route to [facility] ED patient's bp was 706 systolic, was awake, alert, and talking, and was given Versed (5 mg) to sedate. Upon arrival at [facility] ED patient was sleepy but verbally responsive. Patient notes being under \"a lot of stress recently\" per EMS. Upon examination at [facility] ED, patient's bp as 157/129. Patient reports previous history of symptoms presented today \"two years ago. \"     PCP: Margot Huber MD    Note written by Tim Mcelroy, as dictated by Jyoti Latham DO 9:04 PM      The history is provided by the EMS personnel.         Past Medical History:   Diagnosis Date    BPH (benign prostatic hyperplasia)     s/p TURP    CAD (coronary artery disease)     stent to cx 2014    Cancer ([address] 1985    superficial spreading melanoma    GERD (gastroesophageal reflux disease)     Hypertension     Orthostatic hypotension 04/30/2018    Dx'ed by tilt table test spring 2018    Psychiatric disorder 1990's, 2014    situational depression       Past Surgical History:   Procedure Laterality Date    CARDIAC SURG PROCEDURE UNLIST  06/17/2015    BHUPINDER stent    HX COLONOSCOPY      HX IMPLANTABLE CARDIOVERTER DEFIBRILLATOR      HX ORTHOPAEDIC      C5/C6 fusion, bone spur    HX PROSTATECTOMY  2013    TURP    HX TURP           Family History:   Problem Relation Age of Onset    Hypertension Mother     Psychiatric Disorder Mother     Heart Disease Father 80   [facility] Elevated Lipids Sister     Psychiatric Disorder Sister     Elevated Lipids Brother     Psychiatric Disorder Brother        Social History     Social History    Marital status:      Spouse name: N/A    Number of children: N/A    Years of education: N/A     Occupational History    Not on file. Social History Main Topics    Smoking status: Former Smoker     Quit date: 7/7/2014    Smokeless tobacco: Former User     Quit date: 7/7/2010    Alcohol use 4.8 oz/week     8 Shots of liquor per week      Comment: vodka or bourbon    Drug use: No    Sexual activity: Not on file     Other Topics Concern    Not on file     Social History Narrative         ALLERGIES: Codeine and Penicillins    Review of Systems   Unable to perform ROS: Acuity of condition       Vitals:    07/10/18 2103   BP: (!) 130/105            Physical Exam   Constitutional: He appears well-developed and well-nourished. No distress. A little groggy - just had versed by EMS. Neck: No tracheal deviation present. Cardiovascular: Intact distal pulses. Pulmonary/Chest: Effort normal.   Abdominal:   flat   Musculoskeletal: He exhibits no edema. Skin: Skin is warm. He is not diaphoretic. Slight diaphoresis        MDM  Number of Diagnoses or Management Options  Critical Care  Total time providing critical care: 30-74 minutes  40 minutes      ED Course       Procedures   ED EKG interpretation: #1  Rhythm: Sustained VT intermixed with normal sinus rhythm and some V paced beats; regular . Rate (approx.): 125 bpm.  Note written by Tim Jimenez, as dictated by Dominique Vieyra DO 9:07 PM    ED EKG interpretation: #2  Rhythm: V paced with PACs; and regular . Rate (approx.): 83 bpm; nonischemic. Note written by Tim Jimenez, as dictated by Dominique Vieyra DO 9:07 PM    9:17 PM  Dominique Vieyra DO have spent 40 minutes of critical care time involved in lab review, consultations with specialist, family decision-making, and documentation. During this entire length of time I was immediately available to the patient. Critical Care:   The reason for providing this level of medical care for this critically ill patient was due a critical illness that impaired one or more vital organ systems such that there was a high probability of imminent or life threatening deterioration in the patients condition. This care involved high complexity decision making to assess, manipulate, and support vital system functions, to treat this degreee vital organ system failure and to prevent further life threatening deterioration of the patients condition. CONSULT NOTE:  9:17 PM Marleni Nieto DO communicated with Dr. Lopez Tejada, Consult for Cardiology via [facility]. Discussed available diagnostic tests and clinical findings. Dr. Lopez Tejada will come and evaluate patient. Po K, -po ASA, iv mag  Empiric amiodarone load commenced on pt's arrival to room 16. He was shocked once by his ICD. Admit to hospital.  Reviewed chart. Has hx of sustained VT. Reviewed cxr, labs      VT frequency lessened after amio but his heart was still irritable. Discussed with wife and son    Recent Results (from the past 12 hour(s))   MAGNESIUM    Collection Time: 07/10/18  8:57 PM   Result Value Ref Range    Magnesium 1.9 1.6 - 2.4 mg/dL   METABOLIC PANEL, COMPREHENSIVE    Collection Time: 07/10/18  8:57 PM   Result Value Ref Range    Sodium 142 136 - 145 mmol/L    Potassium 3.4 (L) 3.5 - 5.1 mmol/L    Chloride 107 97 - 108 mmol/L    CO2 24 21 - 32 mmol/L    Anion gap 11 5 - 15 mmol/L    Glucose 107 (H) 65 - 100 mg/dL    BUN 17 6 - 20 MG/DL    Creatinine 1.04 0.70 - 1.30 MG/DL    BUN/Creatinine ratio 16 12 - 20      GFR est AA >60 >60 ml/min/1.73m2    GFR est non-AA >60 >60 ml/min/1.73m2    Calcium 8.3 (L) 8.5 - 10.1 MG/DL    Bilirubin, total 0.7 0.2 - 1.0 MG/DL    ALT (SGPT) 37 12 - 78 U/L    AST (SGOT) 22 15 - 37 U/L    Alk.  phosphatase 82 45 - 117 U/L    Protein, total 7.2 6.4 - 8.2 g/dL    Albumin 3.6 3.5 - 5.0 g/dL    Globulin 3.6 2.0 - 4.0 g/dL    A-G Ratio 1.0 (L) 1.1 - 2.2     CBC WITH AUTOMATED DIFF    Collection Time: 07/10/18  8:57 PM   Result Value Ref Range    WBC 11.4 (H) 4.1 - 11.1 K/uL    RBC 4.03 (L) 4.10 - 5.70 M/uL    HGB 13.2 12.1 - 17.0 g/dL    HCT 40.0 36.6 - 50.3 %    MCV 99.3 (H) 80.0 - 99.0 FL    MCH 32.8 26.0 - 34.0 PG    MCHC 33.0 30.0 - 36.5 g/dL    RDW 13.3 11.5 - 14.5 %    PLATELET 026 284 - 891 K/uL    MPV 10.4 8.9 - 12.9 FL    NRBC 0.0 0  WBC    ABSOLUTE NRBC 0.00 0.00 - 0.01 K/uL    NEUTROPHILS 53 32 - 75 %    LYMPHOCYTES 35 12 - 49 %    MONOCYTES 11 5 - 13 %    EOSINOPHILS 0 0 - 7 %    BASOPHILS 1 0 - 1 %    IMMATURE GRANULOCYTES 0 0.0 - 0.5 %    ABS. NEUTROPHILS 6.1 1.8 - 8.0 K/UL    ABS. LYMPHOCYTES 4.0 (H) 0.8 - 3.5 K/UL    ABS. MONOCYTES 1.2 (H) 0.0 - 1.0 K/UL    ABS. EOSINOPHILS 0.0 0.0 - 0.4 K/UL    ABS. BASOPHILS 0.1 0.0 - 0.1 K/UL    ABS. IMM. GRANS. 0.0 0.00 - 0.04 K/UL    DF AUTOMATED     TROPONIN I    Collection Time: 07/10/18  8:57 PM   Result Value Ref Range    Troponin-I, Qt. 0.60 (H) <0.05 ng/mL   ETHYL ALCOHOL    Collection Time: 07/10/18  8:57 PM   Result Value Ref Range    ALCOHOL(ETHYL),SERUM 46 (H) <10 MG/DL   POC CHEM8    Collection Time: 07/10/18  9:02 PM   Result Value Ref Range    Calcium, ionized (POC) 1.09 (L) 1.12 - 1.32 mmol/L    Sodium (POC) 142 136 - 145 mmol/L    Potassium (POC) 3.3 (L) 3.5 - 5.1 mmol/L    Chloride (POC) 104 98 - 107 mmol/L    CO2 (POC) 23 21 - 32 mmol/L    Anion gap (POC) 19 10 - 20 mmol/L    Glucose (POC) 105 (H) 65 - 100 mg/dL    BUN (POC) 18 9 - 20 mg/dL    Creatinine (POC) 1.0 0.6 - 1.3 mg/dL    GFRAA, POC >60 >60 ml/min/1.73m2    GFRNA, POC >60 >60 ml/min/1.73m2    Hematocrit (POC) 41 36.6 - 50.3 %    Comment Comment Not Indicated.      EKG, 12 LEAD, SUBSEQUENT    Collection Time: 07/10/18  9:07 PM   Result Value Ref Range    Ventricular Rate 83 BPM    Atrial Rate 75 BPM    QRS Duration 108 ms    Q-T Interval 412 ms    QTC Calculation (Bezet) 484 ms    Calculated R Axis -26 degrees    Calculated T Axis 50 degrees    Diagnosis       Ventricular-paced rhythm with premature ventricular or aberrantly conducted   complexes  When compared with ECG of 04-MAR-2018 13:59,  Electronic ventricular pacemaker has replaced Sinus rhythm

## 2018-07-11 NOTE — ED NOTES
2049: Patient arrives to ED via EMS, Antonio Sands MD at bedside. Verbal orders received for Amiodarone bolus to be administered over 10 minutes. Patient attached to cardiac monitor, initial EKG performed. 2050: 1 shock administered by patient's AICD. 2051: 1 shock administered by patient's AICD, .

## 2018-07-11 NOTE — PROGRESS NOTES
[facility]  INTENSIVIST DAILY PROGRESS NOTE  Name: Billie Sheets   : 1945   MRN: [id number]   Date: 2018 9:52 AM   I have reviewed the flowsheet and previous days notes  Pt admitted with recurrent d/c of AICD  Known h/o CAD/VT    Now on amio/loicoaine/NTG  S/p ECHO  For cath later  No new Rx or OTC at home  Prior presyncopal episodes  Known moderate JOSE MANUEL AHI 16) - (Diane LU) very compliant with CPAP @ 9cm                ROS: no new sx-  No prodrome    Patient Active Problem List   Diagnosis Code    ASHD (arteriosclerotic heart disease) I25.10    Hypertension, benign I10    Ventricular premature beats I49.3    VT (ventricular tachycardia) ([facility] [address] I47.2    Encounter for long-term (current) drug use Z79.899    Dizziness of unknown cause R42    Dizziness R42    Other headache syndrome G44.89    Orthostatic hypotension I95.1    Ventricular tachycardia (HCC) I47.2    Other hyperlipidemia E78.4     Social History     Social History    Marital status:      Spouse name: N/A    Number of children: N/A    Years of education: N/A     Occupational History    Not on file. Social History Main Topics    Smoking status: Former Smoker     Quit date: 2014    Smokeless tobacco: Former User     Quit date: 2010    Alcohol use 4.8 oz/week     8 Shots of liquor per week      Comment: vodka or bourbon    Drug use: No    Sexual activity: Not on file     Other Topics Concern    Not on file     Social History Narrative     Family History   Problem Relation Age of Onset    Hypertension Mother     Psychiatric Disorder Mother     Heart Disease Father 80    Elevated Lipids Sister     Psychiatric Disorder Sister     Elevated Lipids Brother     Psychiatric Disorder Brother      Prior to Admission Medications   Prescriptions Last Dose Informant Patient Reported? Taking? L. acidophilus/Strept/La p-ryann (REMA-Q PO)   Yes Yes   Sig: Take 1 Cap by mouth daily. PARoxetine (PAXIL) 30 mg tablet 7/10/2018 at Unknown time  No Yes   Sig: TAKE 1 TABLET BY MOUTH EVERY MORNING. amiodarone (CORDARONE) 200 mg tablet   Yes Yes   Sig: Take 100 mg by mouth daily. aspirin delayed-release 81 mg tablet   Yes Yes   Sig: Take 81 mg by mouth daily. atorvastatin (LIPITOR) 40 mg tablet 2018 at Unknown time  Yes Yes   Sig: Take 40 mg by mouth nightly. carvedilol (COREG) 12.5 mg tablet 7/10/2018 at Unknown time  No Yes   Sig: Take 1 Tab by mouth two (2) times daily (with meals). Facility-Administered Medications: None     Allergies   Allergen Reactions    Codeine Nausea and Vomiting    Penicillins Rash     As a child.          Vital Signs:    Visit Vitals    BP (!) 184/120    Pulse 72    Temp 98.6 °F (37 °C)    Resp 26    Ht 6' (1.829 m)    Wt 88.8 kg (195 lb 12.3 oz)    SpO2 94%    BMI 26.55 kg/m2       O2 Device: Room air   O2 Flow Rate (L/min):  (4  l/min)   Temp (24hrs), Av.4 °F (36.9 °C), Min:98.1 °F (36.7 °C), Max:98.6 °F (37 °C)       Intake/Output:   Last shift:      701 -  1900  In: 180.5 [I.V.:180.5]  Out: 1150 [Urine:1150]  Last 3 shifts:  190 -  0700  In: 672.8 [I.V.:672.8]  Out: 955 [Urine:955]    Intake/Output Summary (Last 24 hours) at 18 0952  Last data filed at 18 0945   Gross per 24 hour   Intake           853.25 ml   Output             2105 ml   Net         -1251.75 ml                Physical Exam:  Visit Vitals    BP (!) 184/120    Pulse 72    Temp 98.6 °F (37 °C)    Resp 26    Ht 6' (1.829 m)    Wt 88.8 kg (195 lb 12.3 oz)    SpO2 94%    BMI 26.55 kg/m2     Awake alert  mildy anxious  NC/AT EOMI  Mouth moist MM  Neck no JVD  Chest clear  CV RRR   abd soft  LE neg  Skin dry  Non-focal    DATA:   Current Facility-Administered Medications   Medication Dose Route Frequency    PARoxetine (PAXIL) tablet 30 mg  30 mg Oral DAILY    atorvastatin (LIPITOR) tablet 40 mg  40 mg Oral QHS    aspirin delayed-release tablet 81 mg  81 mg Oral DAILY    nitroglycerin (Tridil) 200 mcg/ml infusion  0-200 mcg/min IntraVENous TITRATE    amLODIPine (NORVASC) tablet 5 mg  5 mg Oral DAILY    metoprolol tartrate (LOPRESSOR) tablet 25 mg  25 mg Oral BID    cloNIDine HCl (CATAPRES) tablet 0.1 mg  0.1 mg Oral Q6H PRN    enoxaparin (LOVENOX) injection 90 mg  1 mg/kg SubCUTAneous Q12H    amiodarone (CORDARONE) 375 mg/250 mL D5W infusion  1 mg/min IntraVENous TITRATE    lidocaine 8 mg/mL (Xylocaine) infusion  2 mg/min IntraVENous CONTINUOUS    0.9% sodium chloride infusion  50 mL/hr IntraVENous CONTINUOUS       Telemetry:          Labs:  Recent Labs      07/10/18   2057   WBC  11.4*   HGB  13.2   HCT  40.0   PLT  190     Recent Labs      07/11/18   0438  07/10/18   2057   NA  139  142   K  3.9  3.4*   CL  106  107   CO2  23  24   GLU  113*  107*   BUN  15  17   CREA  0.88  1.04   CA  8.0*  8.3*   MG  2.4  1.9   ALB   --   3.6   SGOT   --   22   ALT   --   37     No results for input(s): PH, PCO2, PO2, HCO3, FIO2 in the last 72 hours. Imaging:  I have personally reviewed the patients radiographs and reports.  CXR unremarkable       IMPRESSION:   · Recurrent VT w AICD discharges  · CAD  · Orthostatic hypotension  · JOSE MANUEL on CPAP  · GERD   PLAN:   · Await Cath  · ECHO  · DVT prophylaxis  · CPAP as at home      ·          Alvino Charles MD

## 2018-07-11 NOTE — PROGRESS NOTES
Admission Medication Reconciliation:    Information obtained from: Patient     Significant PMH/Disease States:   Past Medical History:   Diagnosis Date    BPH (benign prostatic hyperplasia)     s/p TURP    CAD (coronary artery disease)     stent to cx 2014    Cancer ([facility] [address] 1985    superficial spreading melanoma    GERD (gastroesophageal reflux disease)     Hypertension     Orthostatic hypotension 04/30/2018    Dx'ed by tilt table test spring 2018    Psychiatric disorder 1990's, 2014    situational depression       Chief Complaint for this Admission:  AICD problem    Allergies:  Codeine and Penicillins    Prior to Admission Medications:   Prior to Admission Medications   Prescriptions Last Dose Informant Patient Reported? Taking? L. acidophilus/Strept/La p-ryann (REMA-Q PO)   Yes Yes   Sig: Take 1 Cap by mouth daily. PARoxetine (PAXIL) 30 mg tablet 7/10/2018 at Unknown time  No Yes   Sig: TAKE 1 TABLET BY MOUTH EVERY MORNING. amiodarone (CORDARONE) 200 mg tablet   Yes Yes   Sig: Take 100 mg by mouth daily. aspirin delayed-release 81 mg tablet   Yes Yes   Sig: Take 81 mg by mouth daily. atorvastatin (LIPITOR) 40 mg tablet 7/9/2018 at Unknown time  Yes Yes   Sig: Take 40 mg by mouth nightly. carvedilol (COREG) 12.5 mg tablet 7/10/2018 at Unknown time  No Yes   Sig: Take 1 Tab by mouth two (2) times daily (with meals). Facility-Administered Medications: None         Comments/Recommendations: Med rec completed after speaking with the patient. He also provided a list from home.   Will add probiotic and this was the only change    Kong Palacios PharmD

## 2018-07-11 NOTE — PROGRESS NOTES
00730-Bedside and Verbal shift change report given to Daly Thomson (oncoming nurse) by Rosalia Chapman (offgoing nurse). Report included the following information SBAR, Kardex, ED Summary, Intake/Output, MAR and Recent Results.

## 2018-07-11 NOTE — PROCEDURES
Cardiac Catheterization Procedure Note   Patient: Javy Concepcion  MRN: [id number]  SSN: [id number]   YOB: 1945 Age: 74 y.o.   Sex: male    Date of Procedure: 7/11/2018   Pre-procedure Diagnosis: Coronary Artery Disease and VT  Post-procedure Diagnosis: Coronary Artery Disease and VT  Procedure: Coronary angiogram  Access: Rt radial  :  Dr. Jefferson Leger MD    Assistant(s):  None  Anesthesia: Moderate Sedation   Estimated Blood Loss: Less than 10 mL   Specimens Removed: None  Findings:     Rt radial loop encountered  Straightened with Runthrough wire and catheter  Difficult to advance JL catheter overwire, so balloon assisted tracking technique used  Catheters used: JR4, JL3.5    LM: mild non-obstructive disease  LAD: mild non-obstructive disease  LCx: mild non-obstructive disease, with patent stent in OM2, no significant ISR  RCA: normal    Overall mild non-obstructive CAD with patent OM2 stent    Complications: None   Implants:  None  Signed by:  Jefferson Leger MD  7/11/2018  4:19 PM

## 2018-07-11 NOTE — ED NOTES
Georgi MARTINEZ at bedside, patient attached to EKG per MD. Will repeat EKG as pulse continues to lower.

## 2018-07-11 NOTE — PROGRESS NOTES
TRANSFER - OUT REPORT:    Verbal report given to Jeff Shukla RN(name) on Ketty Gilmore  being transferred to Cath lab(unit) for ordered procedure       Report consisted of patients Situation, Background, Assessment and   Recommendations(SBAR). Information from the following report(s) SBAR, Kardex, ED Summary, Intake/Output, MAR and Recent Results was reviewed with the receiving nurse. Lines:   Peripheral IV 07/10/18 Left Antecubital (Active)   Site Assessment Clean, dry, & intact 7/11/2018 12:00 PM   Phlebitis Assessment 0 7/11/2018 12:00 PM   Infiltration Assessment 0 7/11/2018 12:00 PM   Dressing Status Clean, dry, & intact 7/11/2018 12:00 PM   Dressing Type Tape;Transparent 7/11/2018 12:00 PM   Hub Color/Line Status Green; Infusing 7/11/2018 12:00 PM   Action Taken Open ports on tubing capped 7/11/2018  4:52 AM   Alcohol Cap Used Yes 7/11/2018 12:00 PM       Peripheral IV 07/10/18 Right Antecubital (Active)   Site Assessment Clean, dry, & intact 7/11/2018 12:00 PM   Phlebitis Assessment 0 7/11/2018 12:00 PM   Infiltration Assessment 0 7/11/2018 12:00 PM   Dressing Status Clean, dry, & intact 7/11/2018 12:00 PM   Dressing Type Tape;Transparent 7/11/2018 12:00 PM   Hub Color/Line Status Pink; Infusing 7/11/2018 12:00 PM   Action Taken Open ports on tubing capped 7/11/2018  4:52 AM   Alcohol Cap Used Yes 7/11/2018 12:00 PM       Peripheral IV 07/10/18 Right Arm (Active)   Site Assessment Clean, dry, & intact 7/11/2018 12:00 PM   Phlebitis Assessment 0 7/11/2018 12:00 PM   Infiltration Assessment 0 7/11/2018 12:00 PM   Dressing Status Clean, dry, & intact 7/11/2018 12:00 PM   Dressing Type Tape;Transparent 7/11/2018 12:00 PM   Hub Color/Line Status Pink; Infusing 7/11/2018 12:00 PM   Action Taken Open ports on tubing capped 7/11/2018  4:52 AM   Alcohol Cap Used Yes 7/11/2018 12:00 PM        Opportunity for questions and clarification was provided.       Patient transported with:   Monitor  Registered Nurse  [facility]

## 2018-07-11 NOTE — ED NOTES
Per Admitting Cardiologist MD, patient able to use CPAP device from home. Patient resting in bed comfortably, CPAP machine in use, bed locked & low, call bell within reach, side rails x2, monitor x3. Patient given warm blankets and pillow. Patient denies pain or further needs at this time. Will continue to monitor. Patient's SpO2 ranging between 92-96% using his personal CPAP machine. RT at bedside to assess patient's settings.

## 2018-07-11 NOTE — CONSULTS
Consultation -- Med/PCP    Subjective:     Diana Recinos is a 74 y.o. white male with h/o CAD and VT (s/p AICD). He presented to ER after multiple discharges of his AICD. No other new c/o's. He was admitted to ICU by cardiology, and he is on Amiodarone gtt. Past Medical History:   Diagnosis Date    BPH (benign prostatic hyperplasia)     s/p TURP    CAD (coronary artery disease)     stent to cx 2014    Cancer ([facility] [address] 1985    superficial spreading melanoma    GERD (gastroesophageal reflux disease)     Hypertension     Orthostatic hypotension 04/30/2018    Dx'ed by tilt table test spring 2018    Psychiatric disorder 1990's, 2014    situational depression     Allergies   Allergen Reactions    Codeine Nausea and Vomiting    Penicillins Rash     As a child. Prior to Admission medications    Medication Sig Start Date End Date Taking? Authorizing Provider   L. acidophilus/Strept/La p-ryann (REMA-Q PO) Take 1 Cap by mouth daily. Yes Historical Provider   PARoxetine (PAXIL) 30 mg tablet TAKE 1 TABLET BY MOUTH EVERY MORNING. 3/17/18  Yes Tilden Goldberg, MD   atorvastatin (LIPITOR) 40 mg tablet Take 40 mg by mouth nightly. Yes Historical Provider   carvedilol (COREG) 12.5 mg tablet Take 1 Tab by mouth two (2) times daily (with meals). 1/25/17  Yes Manny Ghotra MD   amiodarone (CORDARONE) 200 mg tablet Take 100 mg by mouth daily. Yes Historical Provider   aspirin delayed-release 81 mg tablet Take 81 mg by mouth daily.    Yes Historical Provider     Social History   Substance Use Topics    Smoking status: Former Smoker     Quit date: 7/7/2014    Smokeless tobacco: Former User     Quit date: 7/7/2010    Alcohol use 4.8 oz/week     8 Shots of liquor per week      Comment: vodka or bourbon     Family History   Problem Relation Age of Onset    Hypertension Mother     Psychiatric Disorder Mother     Heart Disease Father 80    Elevated Lipids Sister     Psychiatric Disorder Sister    [facility] Elevated Lipids Brother     Psychiatric Disorder Brother                Review of Systems:  As above. Objective:       Physical Exam:   In NAD. A&O. HEENT -- Unremarkable. Neck -- Supple. No JVD. Heart -- RRR. Lungs -- CTA. Abdomen -- Soft. Non-tender. Non-distended. No masses. Bowel sounds present. Extremeties -- No edema. Data Review:   Recent Results (from the past 24 hour(s))   MAGNESIUM    Collection Time: 07/10/18  8:57 PM   Result Value Ref Range    Magnesium 1.9 1.6 - 2.4 mg/dL   METABOLIC PANEL, COMPREHENSIVE    Collection Time: 07/10/18  8:57 PM   Result Value Ref Range    Sodium 142 136 - 145 mmol/L    Potassium 3.4 (L) 3.5 - 5.1 mmol/L    Chloride 107 97 - 108 mmol/L    CO2 24 21 - 32 mmol/L    Anion gap 11 5 - 15 mmol/L    Glucose 107 (H) 65 - 100 mg/dL    BUN 17 6 - 20 MG/DL    Creatinine 1.04 0.70 - 1.30 MG/DL    BUN/Creatinine ratio 16 12 - 20      GFR est AA >60 >60 ml/min/1.73m2    GFR est non-AA >60 >60 ml/min/1.73m2    Calcium 8.3 (L) 8.5 - 10.1 MG/DL    Bilirubin, total 0.7 0.2 - 1.0 MG/DL    ALT (SGPT) 37 12 - 78 U/L    AST (SGOT) 22 15 - 37 U/L    Alk. phosphatase 82 45 - 117 U/L    Protein, total 7.2 6.4 - 8.2 g/dL    Albumin 3.6 3.5 - 5.0 g/dL    Globulin 3.6 2.0 - 4.0 g/dL    A-G Ratio 1.0 (L) 1.1 - 2.2     CBC WITH AUTOMATED DIFF    Collection Time: 07/10/18  8:57 PM   Result Value Ref Range    WBC 11.4 (H) 4.1 - 11.1 K/uL    RBC 4.03 (L) 4.10 - 5.70 M/uL    HGB 13.2 12.1 - 17.0 g/dL    HCT 40.0 36.6 - 50.3 %    MCV 99.3 (H) 80.0 - 99.0 FL    MCH 32.8 26.0 - 34.0 PG    MCHC 33.0 30.0 - 36.5 g/dL    RDW 13.3 11.5 - 14.5 %    PLATELET 349 544 - 225 K/uL    MPV 10.4 8.9 - 12.9 FL    NRBC 0.0 0  WBC    ABSOLUTE NRBC 0.00 0.00 - 0.01 K/uL    NEUTROPHILS 53 32 - 75 %    LYMPHOCYTES 35 12 - 49 %    MONOCYTES 11 5 - 13 %    EOSINOPHILS 0 0 - 7 %    BASOPHILS 1 0 - 1 %    IMMATURE GRANULOCYTES 0 0.0 - 0.5 %    ABS. NEUTROPHILS 6.1 1.8 - 8.0 K/UL    ABS.  LYMPHOCYTES 4.0 (H) 0.8 - 3.5 K/UL    ABS. MONOCYTES 1.2 (H) 0.0 - 1.0 K/UL    ABS. EOSINOPHILS 0.0 0.0 - 0.4 K/UL    ABS. BASOPHILS 0.1 0.0 - 0.1 K/UL    ABS. IMM. GRANS. 0.0 0.00 - 0.04 K/UL    DF AUTOMATED     TROPONIN I    Collection Time: 07/10/18  8:57 PM   Result Value Ref Range    Troponin-I, Qt. 0.60 (H) <0.05 ng/mL   ETHYL ALCOHOL    Collection Time: 07/10/18  8:57 PM   Result Value Ref Range    ALCOHOL(ETHYL),SERUM 46 (H) <10 MG/DL   POC CHEM8    Collection Time: 07/10/18  9:02 PM   Result Value Ref Range    Calcium, ionized (POC) 1.09 (L) 1.12 - 1.32 mmol/L    Sodium (POC) 142 136 - 145 mmol/L    Potassium (POC) 3.3 (L) 3.5 - 5.1 mmol/L    Chloride (POC) 104 98 - 107 mmol/L    CO2 (POC) 23 21 - 32 mmol/L    Anion gap (POC) 19 10 - 20 mmol/L    Glucose (POC) 105 (H) 65 - 100 mg/dL    BUN (POC) 18 9 - 20 mg/dL    Creatinine (POC) 1.0 0.6 - 1.3 mg/dL    GFRAA, POC >60 >60 ml/min/1.73m2    GFRNA, POC >60 >60 ml/min/1.73m2    Hematocrit (POC) 41 36.6 - 50.3 %    Comment Comment Not Indicated. EKG, 12 LEAD, SUBSEQUENT    Collection Time: 07/10/18  9:07 PM   Result Value Ref Range    Ventricular Rate 83 BPM    Atrial Rate 75 BPM    QRS Duration 108 ms    Q-T Interval 412 ms    QTC Calculation (Bezet) 484 ms    Calculated R Axis -26 degrees    Calculated T Axis 50 degrees    Diagnosis       Ventricular-paced rhythm with premature ventricular or aberrantly conducted   complexes  When compared with ECG of 04-MAR-2018 13:59,  Electronic ventricular pacemaker has replaced Sinus rhythm     TROPONIN I    Collection Time: 07/10/18 11:19 PM   Result Value Ref Range    Troponin-I, Qt. 3.37 (H) <0.05 ng/mL   TROPONIN I    Collection Time: 07/11/18  4:38 AM   Result Value Ref Range    Troponin-I, Qt. 3.63 (H) <0.05 ng/mL         Assessment:     Principal Problem:    Ventricular tachycardia, AICD in place.       Active Problems:    ASHD (arteriosclerotic heart disease) (6/17/2015)      Hypertension, benign (6/17/2015)      Other hyperlipidemia (7/11/2018)        Plan:     1. Management of VT per cardiology -- ?look at coronaries? ? ablation? 2.  Resume Lipitor, ASA, & Paxil. 3. ?Resume Coreg? -- Defer to cardiology. 4.  I am adding prophylactic Lovenox for DVT prophylaxis.           Signed By: Karen Salmeron MD     July 11, 2018

## 2018-07-11 NOTE — ROUTINE PROCESS
12.  TRANSFER - IN REPORT:    Verbal report received from Richardson Puckett (name) on Ada Perez  being received from ED (unit) for routine progression of care      Report consisted of patients Situation, Background, Assessment and   Recommendations(SBAR). Information from the following report(s) SBAR, Kardex, ED Summary, Procedure Summary, Intake/Output, MAR, Recent Results, Med Rec Status and Cardiac Rhythm sinus with paced beats was reviewed with the receiving nurse. Opportunity for questions and clarification was provided. Assessment completed upon patients arrival to unit and care assumed. 0500. Primary Nurse Franklin Hi RN and Serene Salgado RN performed a dual skin assessment on this patient No impairment noted  Ganesh score is 18    0800. Bedside and Verbal shift change report given to Michael Steel (oncoming nurse) by Selvin Montiel (offgoing nurse). Report included the following information SBAR, Kardex, ED Summary, Procedure Summary, Intake/Output, MAR, Accordion, Recent Results, Cardiac Rhythm nsr and Alarm Parameters .

## 2018-07-11 NOTE — PROGRESS NOTES
1930-Bedside and Verbal shift change report given to [address] [place] (oncoming nurse) by Papa Olvera (offgoing nurse). Report included the following information SBAR, Kardex, ED Summary, Procedure Summary, Intake/Output, MAR and Recent Results.

## 2018-07-11 NOTE — CONSULTS
[facility] CARDIAC ELECTROPHYSIOLOGY CONSULT              Date of  Admission: 7/10/2018  8:51 PM     Assessment and Plan:   Ketty Gilmore is admitted with VT storm. # VT - VT storm with ECGs consistent with outflow flocus. Morphology is similar to prior ablation site, Anterior LV/posterior RV. For the time being will continue iv amiodaorne and lidocaine today to keep things calm. But would like to switch him over to a different antiarrythmic (?flecainide or sotalol). He was unable to tolerate higher dose of amidoaorne due to gait difficulty. He already has ICD in place and EF is normal.  - Cont coreg for now. - Device interorrgation reviewd and will prolong detection to avoid shocks. #  NSTEMI- likely trop elevation due to recurrent VT but given prior CAD and stent agree with [facility]. # CAD - cont med rx. REASON FOR CONSULT: VT  Primary Cardiologist: Cruz Anand    HPI:  74 yom with history of VT and prior IMI and normal EF admitted with VT. He was in usoh until yesterday, felt his device go off about 10 -12 times and called EMS. Enroute patient was noted to have recurrent VT. EMS ECG reviewed (EMS run report page 6, under media). Appears to be outflow tract with lbbb morphology and transition by v3/v4. Here he was given IV amiodarone and IV lidocaine. Has been quiet overnight without any susbsequent VT. He was also given 5 mg of versed for sedation. He reports episodes of syncope and eventually lead to a diagnosis of VT 2 years ago. He underwent dual chamber ICD implant by Dr. Aleisha De Souza. In follow up was on amiodarone but this caused gait difficulty. Went to see Dr. Ngozi Samuels at [facility] and had 2 procedures. Records reviewed. First procedure patient was noted to have a 1.5 cm mass in the aortic root and procedure aborted. This was later identified as a calcification. He underwent EPS in 4/17. Clinical VT was not inducible. Patient had a outflow PVC noted that was ablated in the anterior LVOT. RVOT was also mapped. There was a 97% pacemap. In follow up amiodarone was weaned to 100 mg daily. He has been noted tohave NSVT on prior device checks. Cardiac MRI after words noted inferolateral scar from prior infarct (50-70% thickness). LVOT scarring from ablation. EF was 65%    His check today shows 45 episodes with ATP and shocks. Some of the episodes are nonsustained. Some received ATP. Troponin is 3.6 Patient denies any cp currently. Patient Active Problem List    Diagnosis Date Noted    Other hyperlipidemia 07/11/2018    Ventricular tachycardia ([facility] [address] 07/10/2018    Orthostatic hypotension 04/30/2018    Other headache syndrome 03/05/2018    Dizziness of unknown cause 03/04/2018    Dizziness 03/04/2018    Encounter for long-term (current) drug use 09/26/2016    VT (ventricular tachycardia) ([facility] [address] 07/07/2016    ASHD (arteriosclerotic heart disease) 06/17/2015    Hypertension, benign 06/17/2015    Ventricular premature beats 06/17/2015      Tilden Goldberg, MD  Past Medical History:   Diagnosis Date    BPH (benign prostatic hyperplasia)     s/p TURP    CAD (coronary artery disease)     stent to cx 2014    Cancer ([facility] [address] 1985    superficial spreading melanoma    GERD (gastroesophageal reflux disease)     Hypertension     Orthostatic hypotension 04/30/2018    Dx'ed by tilt table test spring 2018    Psychiatric disorder 1990's, 2014    situational depression      Past Surgical History:   Procedure Laterality Date    CARDIAC SURG PROCEDURE UNLIST  06/17/2015    BHUPINDER stent    HX COLONOSCOPY      HX IMPLANTABLE CARDIOVERTER DEFIBRILLATOR      HX ORTHOPAEDIC      C5/C6 fusion, bone spur    HX PROSTATECTOMY  2013    TURP    HX TURP       Allergies   Allergen Reactions    Codeine Nausea and Vomiting    Penicillins Rash     As a child.       Family History   Problem Relation Age of Onset    Hypertension Mother     Psychiatric Disorder Mother     Heart Disease Father 80    Elevated Lipids Sister     Psychiatric Disorder Sister    Yuliet.Waleis Elevated Lipids Brother     Psychiatric Disorder Brother       Social History     Social History    Marital status:      Spouse name: N/A    Number of children: N/A    Years of education: N/A     Occupational History    Not on file. Social History Main Topics    Smoking status: Former Smoker     Quit date: 7/7/2014    Smokeless tobacco: Former User     Quit date: 7/7/2010    Alcohol use 4.8 oz/week     8 Shots of liquor per week      Comment: vodka or bourbon    Drug use: No    Sexual activity: Not on file     Other Topics Concern    Not on file     Social History Narrative     Current Facility-Administered Medications   Medication Dose Route Frequency    PARoxetine (PAXIL) tablet 30 mg  30 mg Oral DAILY    atorvastatin (LIPITOR) tablet 40 mg  40 mg Oral QHS    aspirin delayed-release tablet 81 mg  81 mg Oral DAILY    nitroglycerin (Tridil) 200 mcg/ml infusion  0-200 mcg/min IntraVENous TITRATE    amLODIPine (NORVASC) tablet 5 mg  5 mg Oral DAILY    metoprolol tartrate (LOPRESSOR) tablet 25 mg  25 mg Oral BID    cloNIDine HCl (CATAPRES) tablet 0.1 mg  0.1 mg Oral Q6H PRN    enoxaparin (LOVENOX) injection 90 mg  1 mg/kg SubCUTAneous Q12H    amiodarone (CORDARONE) 375 mg/250 mL D5W infusion  1 mg/min IntraVENous TITRATE    lidocaine 8 mg/mL (Xylocaine) infusion  2 mg/min IntraVENous CONTINUOUS    0.9% sodium chloride infusion  50 mL/hr IntraVENous CONTINUOUS           Review of Symptoms:  A comprehensive review of systems was negative in 11 points other than stated above in HPI. Physical Exam    Visit Vitals    BP (!) 189/120    Pulse 62    Temp 98.6 °F (37 °C)    Resp 18    Ht 6' (1.829 m)    Wt 88.8 kg (195 lb 12.3 oz)    SpO2 94%    BMI 26.55 kg/m2     Skin warm and dry  HEENT: WNL  Oropharynx without exudate.     Neck supple  Lungs clear  Heart - RRR, Normal S1/ S2   No Mummurs, click or Rubs  No S3 or S4  Abdomen soft and non tender,   Pulses 2+ throughout   Neuro:  Normal facial grimace,  Moves all extremities. AAAO   Psych: unanxious    Labs:   Recent Results (from the past 24 hour(s))   MAGNESIUM    Collection Time: 07/10/18  8:57 PM   Result Value Ref Range    Magnesium 1.9 1.6 - 2.4 mg/dL   METABOLIC PANEL, COMPREHENSIVE    Collection Time: 07/10/18  8:57 PM   Result Value Ref Range    Sodium 142 136 - 145 mmol/L    Potassium 3.4 (L) 3.5 - 5.1 mmol/L    Chloride 107 97 - 108 mmol/L    CO2 24 21 - 32 mmol/L    Anion gap 11 5 - 15 mmol/L    Glucose 107 (H) 65 - 100 mg/dL    BUN 17 6 - 20 MG/DL    Creatinine 1.04 0.70 - 1.30 MG/DL    BUN/Creatinine ratio 16 12 - 20      GFR est AA >60 >60 ml/min/1.73m2    GFR est non-AA >60 >60 ml/min/1.73m2    Calcium 8.3 (L) 8.5 - 10.1 MG/DL    Bilirubin, total 0.7 0.2 - 1.0 MG/DL    ALT (SGPT) 37 12 - 78 U/L    AST (SGOT) 22 15 - 37 U/L    Alk. phosphatase 82 45 - 117 U/L    Protein, total 7.2 6.4 - 8.2 g/dL    Albumin 3.6 3.5 - 5.0 g/dL    Globulin 3.6 2.0 - 4.0 g/dL    A-G Ratio 1.0 (L) 1.1 - 2.2     CBC WITH AUTOMATED DIFF    Collection Time: 07/10/18  8:57 PM   Result Value Ref Range    WBC 11.4 (H) 4.1 - 11.1 K/uL    RBC 4.03 (L) 4.10 - 5.70 M/uL    HGB 13.2 12.1 - 17.0 g/dL    HCT 40.0 36.6 - 50.3 %    MCV 99.3 (H) 80.0 - 99.0 FL    MCH 32.8 26.0 - 34.0 PG    MCHC 33.0 30.0 - 36.5 g/dL    RDW 13.3 11.5 - 14.5 %    PLATELET 314 284 - 555 K/uL    MPV 10.4 8.9 - 12.9 FL    NRBC 0.0 0  WBC    ABSOLUTE NRBC 0.00 0.00 - 0.01 K/uL    NEUTROPHILS 53 32 - 75 %    LYMPHOCYTES 35 12 - 49 %    MONOCYTES 11 5 - 13 %    EOSINOPHILS 0 0 - 7 %    BASOPHILS 1 0 - 1 %    IMMATURE GRANULOCYTES 0 0.0 - 0.5 %    ABS. NEUTROPHILS 6.1 1.8 - 8.0 K/UL    ABS. LYMPHOCYTES 4.0 (H) 0.8 - 3.5 K/UL    ABS. MONOCYTES 1.2 (H) 0.0 - 1.0 K/UL    ABS. EOSINOPHILS 0.0 0.0 - 0.4 K/UL    ABS. BASOPHILS 0.1 0.0 - 0.1 K/UL    ABS. IMM.  GRANS. 0.0 0.00 - 0.04 K/UL    DF AUTOMATED     TROPONIN I    Collection Time: 07/10/18  8:57 PM   Result Value Ref Range    Troponin-I, Qt. 0.60 (H) <0.05 ng/mL   ETHYL ALCOHOL    Collection Time: 07/10/18  8:57 PM   Result Value Ref Range    ALCOHOL(ETHYL),SERUM 46 (H) <10 MG/DL   POC CHEM8    Collection Time: 07/10/18  9:02 PM   Result Value Ref Range    Calcium, ionized (POC) 1.09 (L) 1.12 - 1.32 mmol/L    Sodium (POC) 142 136 - 145 mmol/L    Potassium (POC) 3.3 (L) 3.5 - 5.1 mmol/L    Chloride (POC) 104 98 - 107 mmol/L    CO2 (POC) 23 21 - 32 mmol/L    Anion gap (POC) 19 10 - 20 mmol/L    Glucose (POC) 105 (H) 65 - 100 mg/dL    BUN (POC) 18 9 - 20 mg/dL    Creatinine (POC) 1.0 0.6 - 1.3 mg/dL    GFRAA, POC >60 >60 ml/min/1.73m2    GFRNA, POC >60 >60 ml/min/1.73m2    Hematocrit (POC) 41 36.6 - 50.3 %    Comment Comment Not Indicated.      EKG, 12 LEAD, SUBSEQUENT    Collection Time: 07/10/18  9:07 PM   Result Value Ref Range    Ventricular Rate 83 BPM    Atrial Rate 75 BPM    QRS Duration 108 ms    Q-T Interval 412 ms    QTC Calculation (Bezet) 484 ms    Calculated R Axis -26 degrees    Calculated T Axis 50 degrees    Diagnosis       Ventricular-paced rhythm with premature ventricular or aberrantly conducted   complexes  When compared with ECG of 04-MAR-2018 13:59,  Electronic ventricular pacemaker has replaced Sinus rhythm     TROPONIN I    Collection Time: 07/10/18 11:19 PM   Result Value Ref Range    Troponin-I, Qt. 3.37 (H) <0.05 ng/mL   TROPONIN I    Collection Time: 07/11/18  4:38 AM   Result Value Ref Range    Troponin-I, Qt. 3.63 (H) <9.74 ng/mL   METABOLIC PANEL, BASIC    Collection Time: 07/11/18  4:38 AM   Result Value Ref Range    Sodium 139 136 - 145 mmol/L    Potassium 3.9 3.5 - 5.1 mmol/L    Chloride 106 97 - 108 mmol/L    CO2 23 21 - 32 mmol/L    Anion gap 10 5 - 15 mmol/L    Glucose 113 (H) 65 - 100 mg/dL    BUN 15 6 - 20 MG/DL    Creatinine 0.88 0.70 - 1.30 MG/DL    BUN/Creatinine ratio 17 12 - 20      GFR est AA >60 >60 ml/min/1.73m2    GFR est non-AA >60 >60 ml/min/1.73m2    Calcium 8.0 (L) 8.5 - 10.1 MG/DL   MAGNESIUM    Collection Time: 07/11/18  4:38 AM   Result Value Ref Range    Magnesium 2.4 1.6 - 2.4 mg/dL   EKG, 12 LEAD, INITIAL    Collection Time: 07/11/18  7:47 AM   Result Value Ref Range    Ventricular Rate 63 BPM    Atrial Rate 63 BPM    P-R Interval 196 ms    QRS Duration 98 ms    Q-T Interval 486 ms    QTC Calculation (Bezet) 497 ms    Calculated P Axis 20 degrees    Calculated R Axis 16 degrees    Calculated T Axis 38 degrees    Diagnosis       Normal sinus rhythm  Nonspecific ST abnormality  Prolonged QT  When compared with ECG of 10-JUL-2018 21:07,  Sinus rhythm has replaced Electronic ventricular pacemaker         Cardiographics    Telemetry:  SR  ECG: SR and NSVT  Echocardiogram: EF 55%

## 2018-07-11 NOTE — H&P
[address]   HISTORY AND PHYSICAL      Matt Cabello  MR#: [id number]  : 1945  ACCOUNT #: [de-identified]   ADMIT DATE: 07/10/2018    HISTORY OF PRESENT ILLNESS:  The patient came to the emergency room with multiple ICD shocks and was found to be in recurrent sustained wide complex tachycardia. He indicates he has never had anything like this before. He first got his ICD in  and had a PCI of his circumflex artery. He has had studies since then. He has had VT ablation in 2017 by Dr. Therese Felton at [facility], and-dual chamber ICD implanted by Dr. Ronal Woodruff in 2016. His other medical problems include hypertension, hyperlipidemia, sleep apnea. The patient denies any chest pain or any syncope, but has been made nervous and jittery by the multiple shocks he has received. MEDICATIONS:  His medications at home prior to admission include the following:  Paroxetine 30 mg daily, atorvastatin 40 mg nightly, carvedilol 12.5 twice a day, amiodarone 200 mg tablets, he takes 100 mg by mouth daily, aspirin 81 mg daily. ALLERGIES:  CODEINE AND PENICILLINS. REVIEW OF SYSTEMS:  Negative for edema, diaphoresis, syncope, bleeding, abdominal pain, headache. PHYSICAL EXAMINATION:  GENERAL:  This is a well-developed, alert gentleman in no distress at this time. VITAL SIGNS:  As follows:  Blood pressure is 150/77, heart rate is 104, temperature is normal, sats 96% with 2 liters. HEENT:  Unremarkable. NECK:  Supple. No adenopathy. CHEST WALL:  Nontender. LUNGS:  Clear to auscultation and percussion. HEART:  Regular moderate rhythm, S4 gallop, no S3, no friction rub, no neck vein distention. No hepatojugular reflux. No thrills, lifts or heaves. ABDOMEN:  Soft, nontender, no bruits, no ascites or palpable masses. NEUROLOGIC:  The patient is awake, alert, appropriate. No focal motor signs. Normal speech.     DATA:  His chest x-ray demonstrates clear lung fields, normal cardiac silhouette, ICD wires in place. His hemoglobin, hematocrit, platelet count were normal; white count slightly elevated at 11.4. Chemistries:  Potassium 3.4, magnesium is 1.9, BUN is 17, creatinine 1.04. Troponin 0.60. His 12-lead EKG demonstrates a ventricular paced rhythm with frequent premature ventricular contractions. IMPRESSION:    1. The patient has recurrent wide complex monomorphic ventricular tachycardia. He has been shocked 10-15 times. 2.  The patient has slightly elevated cardiac enzymes, 0.60.  3.  Patient has low potassium and magnesium levels. 4.  History of PCI in 2015 by Dr. Dale Potts. PLAN:  The patient is on an amiodarone drip. We will add lidocaine drip as needed, and beta blockers intravenously, heparin, and serial enzymes will be obtained. We will replete his potassium and magnesium and plan on coronary angiography at some point tomorrow. This was discussed with the patient and his family.       MD ANGELO Carrasquillo/PN  D: 07/10/2018 22:35     T: 07/10/2018 23:00  JOB #: [id number]  Total time spent with the patient: 90 minutes

## 2018-07-11 NOTE — ED TRIAGE NOTES
Pt arrives via EMS from home after AICD shocked patient 10 times after Vtach with pulse. EMS reports patient converted after administered shocks. Patient denies onset of CP or other symptoms prior to shocks. Patient arrives to ED lethargic, but verbally responsive. EMS reports patient awake and talking en route. EMS administered 5 of Versed PTA. Family reports recent increase in stressors within family that may have contributed to tonight's events. Patient's cardiologist: Nir Suarez.

## 2018-07-11 NOTE — ED NOTES
Verbal shift change report given to Rohit (oncoming nurse) by Jeremias Hernández (offgoing nurse). Report included the following information SBAR, ED Summary, MAR, Recent Results and Cardiac Rhythm Paced.

## 2018-07-11 NOTE — PROGRESS NOTES
1300-Dr. Hernandez paged to clarify whether to give pt's lovenox before cardiac cath. Received orders to hold lovenox before cardiac catheterization.

## 2018-07-11 NOTE — PROGRESS NOTES
Shift Summary:  Pt adm to ICU 7 from ED, amiodirone and lidocaine infusing. Pt hypertensive on admission, then bp diminished to acceptable range until 0645 this am.  here, orders for tridil added, ekg and labs. PO meds and prn added for bp control. Pt to go for Cardiac Cath later today.

## 2018-07-12 LAB
ANION GAP SERPL CALC-SCNC: 8 MMOL/L (ref 5–15)
ATRIAL RATE: 64 BPM
BASOPHILS # BLD: 0 K/UL (ref 0–0.1)
BASOPHILS NFR BLD: 0 % (ref 0–1)
BUN SERPL-MCNC: 12 MG/DL (ref 6–20)
BUN/CREAT SERPL: 16 (ref 12–20)
CALCIUM SERPL-MCNC: 7.2 MG/DL (ref 8.5–10.1)
CALCULATED P AXIS, ECG09: 34 DEGREES
CALCULATED R AXIS, ECG10: 16 DEGREES
CALCULATED T AXIS, ECG11: 28 DEGREES
CHLORIDE SERPL-SCNC: 109 MMOL/L (ref 97–108)
CO2 SERPL-SCNC: 25 MMOL/L (ref 21–32)
CREAT SERPL-MCNC: 0.76 MG/DL (ref 0.7–1.3)
DIAGNOSIS, 93000: NORMAL
DIFFERENTIAL METHOD BLD: ABNORMAL
EOSINOPHIL # BLD: 0 K/UL (ref 0–0.4)
EOSINOPHIL NFR BLD: 0 % (ref 0–7)
ERYTHROCYTE [DISTWIDTH] IN BLOOD BY AUTOMATED COUNT: 13.5 % (ref 11.5–14.5)
GLUCOSE SERPL-MCNC: 87 MG/DL (ref 65–100)
HCT VFR BLD AUTO: 36.3 % (ref 36.6–50.3)
HGB BLD-MCNC: 12 G/DL (ref 12.1–17)
IMM GRANULOCYTES # BLD: 0 K/UL (ref 0–0.04)
IMM GRANULOCYTES NFR BLD AUTO: 0 % (ref 0–0.5)
LYMPHOCYTES # BLD: 1.7 K/UL (ref 0.8–3.5)
LYMPHOCYTES NFR BLD: 16 % (ref 12–49)
MAGNESIUM SERPL-MCNC: 1.9 MG/DL (ref 1.6–2.4)
MCH RBC QN AUTO: 33.1 PG (ref 26–34)
MCHC RBC AUTO-ENTMCNC: 33.1 G/DL (ref 30–36.5)
MCV RBC AUTO: 100 FL (ref 80–99)
MONOCYTES # BLD: 1 K/UL (ref 0–1)
MONOCYTES NFR BLD: 10 % (ref 5–13)
NEUTS SEG # BLD: 7.7 K/UL (ref 1.8–8)
NEUTS SEG NFR BLD: 73 % (ref 32–75)
NRBC # BLD: 0 K/UL (ref 0–0.01)
NRBC BLD-RTO: 0 PER 100 WBC
P-R INTERVAL, ECG05: 198 MS
PHOSPHATE SERPL-MCNC: 2.8 MG/DL (ref 2.6–4.7)
PLATELET # BLD AUTO: 147 K/UL (ref 150–400)
PMV BLD AUTO: 11.1 FL (ref 8.9–12.9)
POTASSIUM SERPL-SCNC: 3.6 MMOL/L (ref 3.5–5.1)
Q-T INTERVAL, ECG07: 488 MS
QRS DURATION, ECG06: 108 MS
QTC CALCULATION (BEZET), ECG08: 503 MS
RBC # BLD AUTO: 3.63 M/UL (ref 4.1–5.7)
SODIUM SERPL-SCNC: 142 MMOL/L (ref 136–145)
TROPONIN I SERPL-MCNC: 0.98 NG/ML
VENTRICULAR RATE, ECG03: 64 BPM
WBC # BLD AUTO: 10.5 K/UL (ref 4.1–11.1)

## 2018-07-12 PROCEDURE — 74011250637 HC RX REV CODE- 250/637: Performed by: INTERNAL MEDICINE

## 2018-07-12 PROCEDURE — 74011250636 HC RX REV CODE- 250/636: Performed by: INTERNAL MEDICINE

## 2018-07-12 PROCEDURE — 80048 BASIC METABOLIC PNL TOTAL CA: CPT | Performed by: INTERNAL MEDICINE

## 2018-07-12 PROCEDURE — 85025 COMPLETE CBC W/AUTO DIFF WBC: CPT | Performed by: INTERNAL MEDICINE

## 2018-07-12 PROCEDURE — 93005 ELECTROCARDIOGRAM TRACING: CPT

## 2018-07-12 PROCEDURE — 36415 COLL VENOUS BLD VENIPUNCTURE: CPT | Performed by: INTERNAL MEDICINE

## 2018-07-12 PROCEDURE — 83735 ASSAY OF MAGNESIUM: CPT | Performed by: INTERNAL MEDICINE

## 2018-07-12 PROCEDURE — 84484 ASSAY OF TROPONIN QUANT: CPT | Performed by: INTERNAL MEDICINE

## 2018-07-12 PROCEDURE — 74011250636 HC RX REV CODE- 250/636

## 2018-07-12 PROCEDURE — 84100 ASSAY OF PHOSPHORUS: CPT | Performed by: INTERNAL MEDICINE

## 2018-07-12 PROCEDURE — 65660000000 HC RM CCU STEPDOWN

## 2018-07-12 RX ORDER — POTASSIUM CHLORIDE 750 MG/1
40 TABLET, FILM COATED, EXTENDED RELEASE ORAL DAILY
Status: DISCONTINUED | OUTPATIENT
Start: 2018-07-12 | End: 2018-07-14 | Stop reason: HOSPADM

## 2018-07-12 RX ORDER — LISINOPRIL 10 MG/1
10 TABLET ORAL DAILY
Status: DISCONTINUED | OUTPATIENT
Start: 2018-07-12 | End: 2018-07-13

## 2018-07-12 RX ORDER — ENOXAPARIN SODIUM 100 MG/ML
40 INJECTION SUBCUTANEOUS EVERY 24 HOURS
Status: DISCONTINUED | OUTPATIENT
Start: 2018-07-12 | End: 2018-07-14 | Stop reason: HOSPADM

## 2018-07-12 RX ORDER — LANOLIN ALCOHOL/MO/W.PET/CERES
400 CREAM (GRAM) TOPICAL 2 TIMES DAILY
Status: DISCONTINUED | OUTPATIENT
Start: 2018-07-12 | End: 2018-07-12

## 2018-07-12 RX ORDER — DEXAMETHASONE SODIUM PHOSPHATE 4 MG/ML
INJECTION, SOLUTION INTRA-ARTICULAR; INTRALESIONAL; INTRAMUSCULAR; INTRAVENOUS; SOFT TISSUE
Status: COMPLETED
Start: 2018-07-12 | End: 2018-07-12

## 2018-07-12 RX ORDER — SOTALOL HYDROCHLORIDE 80 MG/1
80 TABLET ORAL EVERY 12 HOURS
Status: DISCONTINUED | OUTPATIENT
Start: 2018-07-12 | End: 2018-07-14 | Stop reason: HOSPADM

## 2018-07-12 RX ORDER — MAGNESIUM SULFATE HEPTAHYDRATE 40 MG/ML
2 INJECTION, SOLUTION INTRAVENOUS ONCE
Status: COMPLETED | OUTPATIENT
Start: 2018-07-12 | End: 2018-07-12

## 2018-07-12 RX ORDER — LANOLIN ALCOHOL/MO/W.PET/CERES
400 CREAM (GRAM) TOPICAL 2 TIMES DAILY
Status: DISCONTINUED | OUTPATIENT
Start: 2018-07-13 | End: 2018-07-14 | Stop reason: HOSPADM

## 2018-07-12 RX ADMIN — ACETAMINOPHEN 650 MG: 325 TABLET ORAL at 09:51

## 2018-07-12 RX ADMIN — SOTALOL HYDROCHLORIDE 80 MG: 80 TABLET ORAL at 21:44

## 2018-07-12 RX ADMIN — POTASSIUM CHLORIDE 40 MEQ: 750 TABLET, EXTENDED RELEASE ORAL at 09:03

## 2018-07-12 RX ADMIN — Medication 10 ML: at 06:44

## 2018-07-12 RX ADMIN — ATORVASTATIN CALCIUM 40 MG: 40 TABLET, FILM COATED ORAL at 21:44

## 2018-07-12 RX ADMIN — ASPIRIN 81 MG: 81 TABLET, COATED ORAL at 09:04

## 2018-07-12 RX ADMIN — DEXAMETHASONE SODIUM PHOSPHATE 4 MG: 4 INJECTION, SOLUTION INTRAMUSCULAR; INTRAVENOUS at 06:44

## 2018-07-12 RX ADMIN — PAROXETINE HYDROCHLORIDE 30 MG: 20 TABLET, FILM COATED ORAL at 09:03

## 2018-07-12 RX ADMIN — LISINOPRIL 10 MG: 10 TABLET ORAL at 09:04

## 2018-07-12 RX ADMIN — ENOXAPARIN SODIUM 40 MG: 100 INJECTION SUBCUTANEOUS at 15:28

## 2018-07-12 RX ADMIN — Medication 10 ML: at 15:25

## 2018-07-12 RX ADMIN — METOPROLOL TARTRATE 25 MG: 25 TABLET ORAL at 18:32

## 2018-07-12 RX ADMIN — SOTALOL HYDROCHLORIDE 80 MG: 80 TABLET ORAL at 09:04

## 2018-07-12 RX ADMIN — METOPROLOL TARTRATE 25 MG: 25 TABLET ORAL at 09:04

## 2018-07-12 RX ADMIN — MAGNESIUM SULFATE HEPTAHYDRATE 2 G: 40 INJECTION, SOLUTION INTRAVENOUS at 09:04

## 2018-07-12 RX ADMIN — SODIUM CHLORIDE 50 ML/HR: 900 INJECTION, SOLUTION INTRAVENOUS at 00:09

## 2018-07-12 RX ADMIN — AMLODIPINE BESYLATE 5 MG: 5 TABLET ORAL at 09:03

## 2018-07-12 RX ADMIN — Medication 10 ML: at 21:53

## 2018-07-12 NOTE — ROUTINE PROCESS
1600 Bedside and Verbal shift change report given to Ronald Lane RN (oncoming nurse) by Suad Valdivia RN (offgoing nurse). Report included the following information SBAR, Kardex, ED Summary, Procedure Summary, Intake/Output, MAR and Recent Results. 2000 Bedside and Verbal shift change report given to tamikocruz, [place] (oncoming nurse) by Ronald Lane RN (offgoing nurse). Report included the following information SBAR, Kardex, ED Summary, Procedure Summary, Intake/Output, MAR and Recent Results.

## 2018-07-12 NOTE — PROGRESS NOTES
Kd Lieberman, Diana & Carmen    Admit Date: 7/10/2018    Subjective:     Cath results noted -- No significant coronary stenosis. No new complaints. Current Facility-Administered Medications   Medication Dose Route Frequency    potassium chloride SR (KLOR-CON 10) tablet 40 mEq  40 mEq Oral DAILY    sotalol (BETAPACE) tablet 80 mg  80 mg Oral Q12H    lisinopril (PRINIVIL, ZESTRIL) tablet 10 mg  10 mg Oral DAILY    PARoxetine (PAXIL) tablet 30 mg  30 mg Oral DAILY    atorvastatin (LIPITOR) tablet 40 mg  40 mg Oral QHS    aspirin delayed-release tablet 81 mg  81 mg Oral DAILY    nitroglycerin (Tridil) 200 mcg/ml infusion  0-200 mcg/min IntraVENous TITRATE    amLODIPine (NORVASC) tablet 5 mg  5 mg Oral DAILY    metoprolol tartrate (LOPRESSOR) tablet 25 mg  25 mg Oral BID    cloNIDine HCl (CATAPRES) tablet 0.1 mg  0.1 mg Oral Q6H PRN    acetaminophen (TYLENOL) tablet 650 mg  650 mg Oral Q6H PRN    sodium chloride (NS) flush 5-10 mL  5-10 mL IntraVENous Q8H    sodium chloride (NS) flush 5-10 mL  5-10 mL IntraVENous PRN    0.9% sodium chloride infusion  50 mL/hr IntraVENous CONTINUOUS          Objective:     Patient Vitals for the past 8 hrs:   BP Temp Pulse Resp SpO2   07/12/18 0700 (!) 185/101 - 66 18 95 %   07/12/18 0600 146/85 - 62 17 92 %   07/12/18 0545 (!) 175/105 - - - -   07/12/18 0522 (!) 168/100 - 61 - -   07/12/18 0500 (!) 170/93 - 64 18 95 %   07/12/18 0400 (!) 162/99 - 62 13 97 %   07/12/18 0300 137/65 - 60 12 96 %   07/12/18 0100 137/82 - 60 (!) 0 97 %   07/12/18 0030 134/79 - 60 - -   07/12/18 0015 130/76 - 61 - -   07/12/18 0000 130/79 99.1 °F (37.3 °C) 62 13 96 %        07/10 1901 - 07/12 0700  In: 1835.4 [P.O.:180; I.V.:1655.4]  Out: 2905 [Urine:2905]    Physical Exam: NAD. A&O. Neck -- Supple. No JVD. Heart -- RRR. Lungs -- CTA. Abd -- Benign. Ext -- No LE edema, b/l. Data Review   Recent Results (from the past 24 hour(s))   EKG, 12 LEAD, INITIAL    Collection Time: 07/11/18  7:47 AM   Result Value Ref Range    Ventricular Rate 64 BPM    Atrial Rate 64 BPM    P-R Interval 196 ms    QRS Duration 102 ms    Q-T Interval 476 ms    QTC Calculation (Bezet) 491 ms    Calculated P Axis 8 degrees    Calculated R Axis 19 degrees    Calculated T Axis 40 degrees    Diagnosis       Normal sinus rhythm  Prolonged QT  When compared with ECG of 10-JUL-2018 21:07,  No significant change was found    Confirmed by Johnita Meckel MD. ([id number]) on 7/11/2018 9:59:11 PM     TROPONIN I    Collection Time: 07/11/18 10:51 AM   Result Value Ref Range    Troponin-I, Qt. 2.46 (H) <0.05 ng/mL   CBC WITH AUTOMATED DIFF    Collection Time: 07/12/18  5:20 AM   Result Value Ref Range    WBC 10.5 4.1 - 11.1 K/uL    RBC 3.63 (L) 4.10 - 5.70 M/uL    HGB 12.0 (L) 12.1 - 17.0 g/dL    HCT 36.3 (L) 36.6 - 50.3 %    .0 (H) 80.0 - 99.0 FL    MCH 33.1 26.0 - 34.0 PG    MCHC 33.1 30.0 - 36.5 g/dL    RDW 13.5 11.5 - 14.5 %    PLATELET 969 (L) 682 - 400 K/uL    MPV 11.1 8.9 - 12.9 FL    NRBC 0.0 0  WBC    ABSOLUTE NRBC 0.00 0.00 - 0.01 K/uL    NEUTROPHILS 73 32 - 75 %    LYMPHOCYTES 16 12 - 49 %    MONOCYTES 10 5 - 13 %    EOSINOPHILS 0 0 - 7 %    BASOPHILS 0 0 - 1 %    IMMATURE GRANULOCYTES 0 0.0 - 0.5 %    ABS. NEUTROPHILS 7.7 1.8 - 8.0 K/UL    ABS. LYMPHOCYTES 1.7 0.8 - 3.5 K/UL    ABS. MONOCYTES 1.0 0.0 - 1.0 K/UL    ABS. EOSINOPHILS 0.0 0.0 - 0.4 K/UL    ABS. BASOPHILS 0.0 0.0 - 0.1 K/UL    ABS. IMM.  GRANS. 0.0 0.00 - 0.04 K/UL    DF AUTOMATED     METABOLIC PANEL, BASIC    Collection Time: 07/12/18  5:20 AM   Result Value Ref Range    Sodium 142 136 - 145 mmol/L    Potassium 3.6 3.5 - 5.1 mmol/L    Chloride 109 (H) 97 - 108 mmol/L    CO2 25 21 - 32 mmol/L    Anion gap 8 5 - 15 mmol/L    Glucose 87 65 - 100 mg/dL    BUN 12 6 - 20 MG/DL    Creatinine 0.76 0.70 - 1.30 MG/DL    BUN/Creatinine ratio 16 12 - 20      GFR est AA >60 >60 ml/min/1.73m2    GFR est non-AA >60 >60 ml/min/1.73m2    Calcium 7.2 (L) 8.5 - 10.1 MG/DL   MAGNESIUM    Collection Time: 07/12/18  5:20 AM   Result Value Ref Range    Magnesium 1.9 1.6 - 2.4 mg/dL   PHOSPHORUS    Collection Time: 07/12/18  5:20 AM   Result Value Ref Range    Phosphorus 2.8 2.6 - 4.7 MG/DL   TROPONIN I    Collection Time: 07/12/18  5:20 AM   Result Value Ref Range    Troponin-I, Qt. 0.98 (H) <0.05 ng/mL   EKG, 12 LEAD, INITIAL    Collection Time: 07/12/18  7:14 AM   Result Value Ref Range    Ventricular Rate 64 BPM    Atrial Rate 64 BPM    P-R Interval 198 ms    QRS Duration 108 ms    Q-T Interval 488 ms    QTC Calculation (Bezet) 503 ms    Calculated P Axis 34 degrees    Calculated R Axis 16 degrees    Calculated T Axis 28 degrees    Diagnosis       Normal sinus rhythm  Prolonged QT  When compared with ECG of 11-JUL-2018 07:47,  No significant change was found             Assessment:     Principal Problem:    Ventricular tachycardia storm ([address] (7/10/2018)      Active Problems:    ASHD (arteriosclerotic heart disease) (6/17/2015)      Hypertension, benign (6/17/2015)      Other hyperlipidemia (7/11/2018)        Plan:     1. Management of VT per cardiology -- antiarrhythmic being changed to Sotalol. 2. K+ repletion already been ordered. I had a long conversation with pt explaining his situation and plans.       Libertad Hall MD

## 2018-07-12 NOTE — PROGRESS NOTES
1930 Bedside and Verbal shift change report given to Corey Quintana RN (oncoming nurse) by Renee Roberts RN (offgoing nurse). Report included the following information SBAR, Kardex, ED Summary, Procedure Summary, Intake/Output, MAR, Accordion, Recent Results, Cardiac Rhythm NSR/Paced and Alarm Parameters . 0730  Bedside and Verbal shift change report given to  (oncoming nurse) by Olla Simmonds, RN (offgoing nurse). Report included the following information SBAR, Kardex, ED Summary, Procedure Summary, Intake/Output, MAR, Accordion, Recent Results, Cardiac Rhythm NSR/Paced and Alarm Parameters .

## 2018-07-12 NOTE — PROGRESS NOTES
[facility] Cardiology Progress Note                                        Admit Date: 7/10/2018      Assessment/Plan:   Beena Wallace is admitted with VT storm. # VT - VT storm with ECGs consistent with outflow flocus. Morphology is similar to prior ablation site, Anterior LV/posterior RV.    - did not tolerate higher dose of amiodarone previously. - QTC is ~480-500 but K has been low. - will stop amiodarone/lidocaine today and start sotalol 80 bid. - Device interorrgation reviewd. Shocks were for VT, however, he was having nonsustained episodes which would have been fine without \shocks. Settings changed to redetect sr sooner and prolonged intervals for initial detection to avoid shocks. - Will see how he does on sotalol, but would have low threshold for repeat EPS ablation if recurrence.      #  NSTEMI- likely trop elevation due to recurrent VT and shocks. Cath negative. # CAD - cont med rx. # HTN - on ntg, will add lisinopril oral.    - ok for transfer to step down later today if remains unchanged. 7/12/18 5:23 pm. Met with patient and family again to review his course and plans for sotalol for now. Subjective:     Patient denies any complaints. [facility] without CAD yesterday    Visit Vitals    BP (!) 175/105    Pulse 61    Temp 99.1 °F (37.3 °C)    Resp 18    Ht 6' (1.829 m)    Wt 88.8 kg (195 lb 12.3 oz)    SpO2 95%    BMI 26.55 kg/m2       Intake/Output Summary (Last 24 hours) at 07/12/18 0634  Last data filed at 07/11/18 2100   Gross per 24 hour   Intake           1247.6 ml   Output             1950 ml   Net           -702.4 ml       Objective:      Physical Exam:  HEENT: EOMI  Neck: supple  Resp:  CTA bilaterally;  No wheezes or rales  CV: RRR s1s2 No murmur no s3  Abd:Soft, Nontender  Ext: No edema  Neuro: Alert and oriented; Nonfocal  Skin: Warm, Dry, Intact      Telemetry: SR    Current Facility-Administered Medications   Medication Dose Route Frequency    PARoxetine (PAXIL) tablet 30 mg 30 mg Oral DAILY    atorvastatin (LIPITOR) tablet 40 mg  40 mg Oral QHS    aspirin delayed-release tablet 81 mg  81 mg Oral DAILY    nitroglycerin (Tridil) 200 mcg/ml infusion  0-200 mcg/min IntraVENous TITRATE    amLODIPine (NORVASC) tablet 5 mg  5 mg Oral DAILY    metoprolol tartrate (LOPRESSOR) tablet 25 mg  25 mg Oral BID    cloNIDine HCl (CATAPRES) tablet 0.1 mg  0.1 mg Oral Q6H PRN    acetaminophen (TYLENOL) tablet 650 mg  650 mg Oral Q6H PRN    sodium chloride (NS) flush 5-10 mL  5-10 mL IntraVENous Q8H    sodium chloride (NS) flush 5-10 mL  5-10 mL IntraVENous PRN    amiodarone (CORDARONE) 375 mg/250 mL D5W infusion  1 mg/min IntraVENous TITRATE    lidocaine 8 mg/mL (Xylocaine) infusion  1 mg/min IntraVENous CONTINUOUS    0.9% sodium chloride infusion  50 mL/hr IntraVENous CONTINUOUS         Data Review:   Labs:    Recent Results (from the past 24 hour(s))   EKG, 12 LEAD, INITIAL    Collection Time: 07/11/18  7:47 AM   Result Value Ref Range    Ventricular Rate 64 BPM    Atrial Rate 64 BPM    P-R Interval 196 ms    QRS Duration 102 ms    Q-T Interval 476 ms    QTC Calculation (Bezet) 491 ms    Calculated P Axis 8 degrees    Calculated R Axis 19 degrees    Calculated T Axis 40 degrees    Diagnosis       Normal sinus rhythm  Prolonged QT  When compared with ECG of 10-JUL-2018 21:07,  No significant change was found    Confirmed by Paula Zamarripa MD. ([id number]) on 7/11/2018 9:59:11 PM     TROPONIN I    Collection Time: 07/11/18 10:51 AM   Result Value Ref Range    Troponin-I, Qt. 2.46 (H) <0.05 ng/mL   CBC WITH AUTOMATED DIFF    Collection Time: 07/12/18  5:20 AM   Result Value Ref Range    WBC 10.5 4.1 - 11.1 K/uL    RBC 3.63 (L) 4.10 - 5.70 M/uL    HGB 12.0 (L) 12.1 - 17.0 g/dL    HCT 36.3 (L) 36.6 - 50.3 %    .0 (H) 80.0 - 99.0 FL    MCH 33.1 26.0 - 34.0 PG    MCHC 33.1 30.0 - 36.5 g/dL    RDW 13.5 11.5 - 14.5 %    PLATELET 647 (L) 503 - 400 K/uL    MPV 11.1 8.9 - 12.9 FL    NRBC 0.0 0 PER 100 WBC    ABSOLUTE NRBC 0.00 0.00 - 0.01 K/uL    NEUTROPHILS 73 32 - 75 %    LYMPHOCYTES 16 12 - 49 %    MONOCYTES 10 5 - 13 %    EOSINOPHILS 0 0 - 7 %    BASOPHILS 0 0 - 1 %    IMMATURE GRANULOCYTES 0 0.0 - 0.5 %    ABS. NEUTROPHILS 7.7 1.8 - 8.0 K/UL    ABS. LYMPHOCYTES 1.7 0.8 - 3.5 K/UL    ABS. MONOCYTES 1.0 0.0 - 1.0 K/UL    ABS. EOSINOPHILS 0.0 0.0 - 0.4 K/UL    ABS. BASOPHILS 0.0 0.0 - 0.1 K/UL    ABS. IMM.  GRANS. 0.0 0.00 - 0.04 K/UL    DF AUTOMATED     METABOLIC PANEL, BASIC    Collection Time: 07/12/18  5:20 AM   Result Value Ref Range    Sodium 142 136 - 145 mmol/L    Potassium 3.6 3.5 - 5.1 mmol/L    Chloride 109 (H) 97 - 108 mmol/L    CO2 25 21 - 32 mmol/L    Anion gap 8 5 - 15 mmol/L    Glucose 87 65 - 100 mg/dL    BUN 12 6 - 20 MG/DL    Creatinine 0.76 0.70 - 1.30 MG/DL    BUN/Creatinine ratio 16 12 - 20      GFR est AA >60 >60 ml/min/1.73m2    GFR est non-AA >60 >60 ml/min/1.73m2    Calcium 7.2 (L) 8.5 - 10.1 MG/DL   MAGNESIUM    Collection Time: 07/12/18  5:20 AM   Result Value Ref Range    Magnesium 1.9 1.6 - 2.4 mg/dL   PHOSPHORUS    Collection Time: 07/12/18  5:20 AM   Result Value Ref Range    Phosphorus 2.8 2.6 - 4.7 MG/DL   TROPONIN I    Collection Time: 07/12/18  5:20 AM   Result Value Ref Range    Troponin-I, Qt. 0.98 (H) <0.05 ng/mL

## 2018-07-13 ENCOUNTER — DOCUMENTATION ONLY (OUTPATIENT)
Dept: CASE MANAGEMENT | Age: 73
End: 2018-07-13

## 2018-07-13 LAB
ATRIAL RATE: 60 BPM
CALCULATED P AXIS, ECG09: 3 DEGREES
CALCULATED R AXIS, ECG10: 33 DEGREES
CALCULATED T AXIS, ECG11: 42 DEGREES
DIAGNOSIS, 93000: NORMAL
P-R INTERVAL, ECG05: 112 MS
Q-T INTERVAL, ECG07: 526 MS
QRS DURATION, ECG06: 114 MS
QTC CALCULATION (BEZET), ECG08: 526 MS
VENTRICULAR RATE, ECG03: 60 BPM

## 2018-07-13 PROCEDURE — 74011250637 HC RX REV CODE- 250/637: Performed by: INTERNAL MEDICINE

## 2018-07-13 PROCEDURE — 65660000000 HC RM CCU STEPDOWN

## 2018-07-13 PROCEDURE — 74011250636 HC RX REV CODE- 250/636: Performed by: INTERNAL MEDICINE

## 2018-07-13 PROCEDURE — 93005 ELECTROCARDIOGRAM TRACING: CPT

## 2018-07-13 PROCEDURE — 74011000250 HC RX REV CODE- 250: Performed by: INTERNAL MEDICINE

## 2018-07-13 RX ORDER — AMLODIPINE BESYLATE 5 MG/1
10 TABLET ORAL DAILY
Status: DISCONTINUED | OUTPATIENT
Start: 2018-07-13 | End: 2018-07-14 | Stop reason: HOSPADM

## 2018-07-13 RX ORDER — CLONIDINE HYDROCHLORIDE 0.1 MG/1
0.1 TABLET ORAL 2 TIMES DAILY
Status: DISCONTINUED | OUTPATIENT
Start: 2018-07-13 | End: 2018-07-14 | Stop reason: HOSPADM

## 2018-07-13 RX ORDER — LISINOPRIL 20 MG/1
20 TABLET ORAL DAILY
Status: DISCONTINUED | OUTPATIENT
Start: 2018-07-13 | End: 2018-07-14 | Stop reason: HOSPADM

## 2018-07-13 RX ADMIN — ENOXAPARIN SODIUM 40 MG: 100 INJECTION SUBCUTANEOUS at 12:55

## 2018-07-13 RX ADMIN — Medication 10 ML: at 13:05

## 2018-07-13 RX ADMIN — LISINOPRIL 20 MG: 20 TABLET ORAL at 08:55

## 2018-07-13 RX ADMIN — PAROXETINE HYDROCHLORIDE 30 MG: 20 TABLET, FILM COATED ORAL at 08:55

## 2018-07-13 RX ADMIN — ATORVASTATIN CALCIUM 40 MG: 40 TABLET, FILM COATED ORAL at 22:00

## 2018-07-13 RX ADMIN — AMLODIPINE BESYLATE 10 MG: 5 TABLET ORAL at 08:56

## 2018-07-13 RX ADMIN — ASPIRIN 81 MG: 81 TABLET, COATED ORAL at 08:55

## 2018-07-13 RX ADMIN — CLONIDINE HYDROCHLORIDE 0.1 MG: 0.1 TABLET ORAL at 08:55

## 2018-07-13 RX ADMIN — SOTALOL HYDROCHLORIDE 80 MG: 80 TABLET ORAL at 20:51

## 2018-07-13 RX ADMIN — SOTALOL HYDROCHLORIDE 80 MG: 80 TABLET ORAL at 08:55

## 2018-07-13 RX ADMIN — Medication 10 ML: at 22:00

## 2018-07-13 RX ADMIN — Medication 10 ML: at 09:01

## 2018-07-13 RX ADMIN — Medication 400 MG: at 18:46

## 2018-07-13 RX ADMIN — Medication 400 MG: at 08:56

## 2018-07-13 RX ADMIN — NITROGLYCERIN 3 MCG/MIN: 20 INJECTION INTRAVENOUS at 02:24

## 2018-07-13 RX ADMIN — POTASSIUM CHLORIDE 40 MEQ: 750 TABLET, EXTENDED RELEASE ORAL at 08:56

## 2018-07-13 RX ADMIN — CLONIDINE HYDROCHLORIDE 0.1 MG: 0.1 TABLET ORAL at 18:46

## 2018-07-13 NOTE — PROGRESS NOTES
Physical Therapy  7/13/18    Order received. Patient chart reviewed. Discussed with RN. Patient up walking unit. No mobility deficits. PT assessment not indicated. Will complete order. Plan for d/c tomorrow. Steph Baldwin, PT, DPT

## 2018-07-13 NOTE — PROGRESS NOTES
Problem: Falls - Risk of  Goal: *Absence of Falls  Document Libra Fall Risk and appropriate interventions in the flowsheet.    Outcome: Progressing Towards Goal  Fall Risk Interventions:  Mobility Interventions: Communicate number of staff needed for ambulation/transfer, Patient to call before getting OOB, Assess mobility with egress test         Medication Interventions: Assess postural VS orthostatic hypotension, Evaluate medications/consider consulting pharmacy    Elimination Interventions: Patient to call for help with toileting needs, Call light in reach

## 2018-07-13 NOTE — PROGRESS NOTES
[facility] Cardiology Progress Note                                        Admit Date: 7/10/2018      Assessment/Plan:   Tiff Mcallister is admitted with VT storm. # VT - VT storm with ECGs consistent with outflow flocus. Morphology is similar to prior ablation site, Anterior LV/posterior RV.    - did not tolerate higher dose of amiodarone previously. - QTC is ~480-500 but K has been low. - cont sotalol 80 bid, will not be able to increase due to qt  - EPS and ablation if recurrence. - device repogrammed to minimize shocks.      #  NSTEMI- likely trop elevation due to recurrent VT and shocks. Cath negative. # CAD - cont med rx. # HTN - on ntg, increase lisinopril, stop metoprolol. Out to floor today, home tomorrow. Subjective:     Patient denies any complaints. Walked yesterday without diffculty. Visit Vitals    /71 (BP 1 Location: Left arm, BP Patient Position: At rest)    Pulse 60    Temp 98.3 °F (36.8 °C)    Resp 17    Ht 6' (1.829 m)    Wt 88.4 kg (194 lb 14.2 oz)    SpO2 95%    BMI 26.43 kg/m2       Intake/Output Summary (Last 24 hours) at 07/13/18 7973  Last data filed at 07/13/18 0400   Gross per 24 hour   Intake           933.24 ml   Output             1075 ml   Net          -141.76 ml       Objective:      Physical Exam:  HEENT: EOMI  Neck: supple  Resp:  CTA bilaterally;  No wheezes or rales  CV: RRR s1s2 No murmur no s3  Abd:Soft, Nontender  Ext: No edema  Neuro: Alert and oriented; Nonfocal  Skin: Warm, Dry, Intact      Telemetry: SR, rare pvcs    Current Facility-Administered Medications   Medication Dose Route Frequency    lisinopril (PRINIVIL, ZESTRIL) tablet 20 mg  20 mg Oral DAILY    potassium chloride SR (KLOR-CON 10) tablet 40 mEq  40 mEq Oral DAILY    sotalol (BETAPACE) tablet 80 mg  80 mg Oral Q12H    magnesium oxide (MAG-OX) tablet 400 mg  400 mg Oral BID    enoxaparin (LOVENOX) injection 40 mg  40 mg SubCUTAneous Q24H    PARoxetine (PAXIL) tablet 30 mg  30 mg Oral DAILY    atorvastatin (LIPITOR) tablet 40 mg  40 mg Oral QHS    aspirin delayed-release tablet 81 mg  81 mg Oral DAILY    nitroglycerin (Tridil) 200 mcg/ml infusion  0-200 mcg/min IntraVENous TITRATE    amLODIPine (NORVASC) tablet 5 mg  5 mg Oral DAILY    cloNIDine HCl (CATAPRES) tablet 0.1 mg  0.1 mg Oral Q6H PRN    acetaminophen (TYLENOL) tablet 650 mg  650 mg Oral Q6H PRN    sodium chloride (NS) flush 5-10 mL  5-10 mL IntraVENous Q8H    sodium chloride (NS) flush 5-10 mL  5-10 mL IntraVENous PRN    0.9% sodium chloride infusion  50 mL/hr IntraVENous CONTINUOUS         Data Review:   Labs:    Recent Results (from the past 24 hour(s))   EKG, 12 LEAD, INITIAL    Collection Time: 07/12/18  7:14 AM   Result Value Ref Range    Ventricular Rate 64 BPM    Atrial Rate 64 BPM    P-R Interval 198 ms    QRS Duration 108 ms    Q-T Interval 488 ms    QTC Calculation (Bezet) 503 ms    Calculated P Axis 34 degrees    Calculated R Axis 16 degrees    Calculated T Axis 28 degrees    Diagnosis       Normal sinus rhythm  Prolonged QT  When compared with ECG of 11-JUL-2018 07:47,  No significant change was found  Confirmed by Edgardo Wu MD, Pranay Butler ([id number]) on 7/12/2018 8:47:24 AM

## 2018-07-13 NOTE — PROGRESS NOTES
[facility]    Admit Date: 7/10/2018    Subjective:       No new complaints. Current Facility-Administered Medications   Medication Dose Route Frequency    lisinopril (PRINIVIL, ZESTRIL) tablet 20 mg  20 mg Oral DAILY    potassium chloride SR (KLOR-CON 10) tablet 40 mEq  40 mEq Oral DAILY    sotalol (BETAPACE) tablet 80 mg  80 mg Oral Q12H    magnesium oxide (MAG-OX) tablet 400 mg  400 mg Oral BID    enoxaparin (LOVENOX) injection 40 mg  40 mg SubCUTAneous Q24H    PARoxetine (PAXIL) tablet 30 mg  30 mg Oral DAILY    atorvastatin (LIPITOR) tablet 40 mg  40 mg Oral QHS    aspirin delayed-release tablet 81 mg  81 mg Oral DAILY    nitroglycerin (Tridil) 200 mcg/ml infusion  0-200 mcg/min IntraVENous TITRATE    amLODIPine (NORVASC) tablet 5 mg  5 mg Oral DAILY    cloNIDine HCl (CATAPRES) tablet 0.1 mg  0.1 mg Oral Q6H PRN    acetaminophen (TYLENOL) tablet 650 mg  650 mg Oral Q6H PRN    sodium chloride (NS) flush 5-10 mL  5-10 mL IntraVENous Q8H    sodium chloride (NS) flush 5-10 mL  5-10 mL IntraVENous PRN    0.9% sodium chloride infusion  50 mL/hr IntraVENous CONTINUOUS          Objective:     Patient Vitals for the past 8 hrs:   BP Temp Pulse Resp SpO2 Weight   07/13/18 0500 144/89 - 60 12 94 % -   07/13/18 0400 128/71 98.3 °F (36.8 °C) 60 17 95 % -   07/13/18 0300 131/72 - 60 14 93 % -   07/13/18 0200 143/84 - 60 14 92 % -   07/13/18 0100 149/87 - 60 13 95 % 194 lb 14.2 oz (88.4 kg)   07/13/18 0000 139/81 98.6 °F (37 °C) 60 16 94 % -        07/11 1901 - 07/13 0700  In: 1876 [P.O.:300; I.V.:1576]  Out: 3325 [Urine:3325]    Physical Exam: NAD. A&O. Neck -- Supple. No JVD. Heart -- RRR. Lungs -- CTA. Abd -- Benign. Ext -- No LE edema, b/l.       Data Review   No results found for this or any previous visit (from the past 24 hour(s)). Assessment:     Principal Problem:    Ventricular tachycardia storm ([address] (7/10/2018)      Active Problems:    ASHD (arteriosclerotic heart disease) (6/17/2015)      Hypertension, benign (6/17/2015)      Other hyperlipidemia (7/11/2018)        Plan:     1. Management of VT per cardiology -- On Sotalol. 2. To tele today, possibly home tomorrow.           Ronny Schuler MD

## 2018-07-13 NOTE — PROGRESS NOTES
The objective of todays visit was to review the transitional care plan with the patient. We discussed the importance of transitional care as it relates to reducing the likelihood of readmission. We reviewed the goals for the first 30 days following hospital discharge. The patient and I discussed wrap around services including nurse navigation, care coordination, home health, transitional care appointments with their primary care provider and specialist team and the role of dispatch health. Patient education focused on readmission zones as described as  The Red Zone: High risk for readmission, days 1-21  The Yellow Zone: Moderate risk for readmission, days 22-29  The Green Zone: Lower risk for readmission, days 30 and after    Met with the patient was instructed on worrisome symptoms for worsening of their medical conditions and sepsis. CHF, ICD, SOB. Patient is much improved and will be transferred to [facility] later today. His PCP Dr. Hari ellison only allow his patients to make their own appointments. Cardiology office was called and they have not scheduled appointment so I called Errol Lucas and she will set up appointment and call me back. We discussed Dispatch Health. Patient is now scheduled for cardiology 7/25/18 @ 10:45. Learning was assessed using teach back in the form of verbalizing understanding of services. The patient identified appropriate wrap around services during this interaction. The patient expressed the following specific concerns regarding their discharge  None  Patient states that he has an advanced directive at home and he will have his wife bring a copy to be scanned into chart. Needs NN. Went to give patient HUG paper and to explain that since we cannot make him an appointment with his PCP within 48 hrs. His cardiology appointment is not until 7/25/18. So I suggested I call [facility] and have them see him.  Patient got angry and said he wasn't going to be a part of the project because he is not going to any appointments until he gets ready. He is going to wait to see Dr. Nicky Carter. He threw the paperwork at me and told me to get out.  I report this to Renee Sands.

## 2018-07-14 VITALS
HEIGHT: 72 IN | SYSTOLIC BLOOD PRESSURE: 118 MMHG | WEIGHT: 189.6 LBS | BODY MASS INDEX: 25.68 KG/M2 | TEMPERATURE: 97 F | RESPIRATION RATE: 20 BRPM | OXYGEN SATURATION: 99 % | DIASTOLIC BLOOD PRESSURE: 69 MMHG | HEART RATE: 60 BPM

## 2018-07-14 LAB
ANION GAP SERPL CALC-SCNC: 5 MMOL/L (ref 5–15)
ATRIAL RATE: 60 BPM
BUN SERPL-MCNC: 18 MG/DL (ref 6–20)
BUN/CREAT SERPL: 19 (ref 12–20)
CALCIUM SERPL-MCNC: 8.4 MG/DL (ref 8.5–10.1)
CALCULATED P AXIS, ECG09: 48 DEGREES
CALCULATED R AXIS, ECG10: 37 DEGREES
CALCULATED T AXIS, ECG11: 45 DEGREES
CHLORIDE SERPL-SCNC: 106 MMOL/L (ref 97–108)
CO2 SERPL-SCNC: 27 MMOL/L (ref 21–32)
CREAT SERPL-MCNC: 0.95 MG/DL (ref 0.7–1.3)
DIAGNOSIS, 93000: NORMAL
GLUCOSE SERPL-MCNC: 101 MG/DL (ref 65–100)
MAGNESIUM SERPL-MCNC: 2.1 MG/DL (ref 1.6–2.4)
P-R INTERVAL, ECG05: 110 MS
POTASSIUM SERPL-SCNC: 3.9 MMOL/L (ref 3.5–5.1)
Q-T INTERVAL, ECG07: 502 MS
QRS DURATION, ECG06: 106 MS
QTC CALCULATION (BEZET), ECG08: 502 MS
SODIUM SERPL-SCNC: 138 MMOL/L (ref 136–145)
VENTRICULAR RATE, ECG03: 60 BPM

## 2018-07-14 PROCEDURE — 83735 ASSAY OF MAGNESIUM: CPT | Performed by: INTERNAL MEDICINE

## 2018-07-14 PROCEDURE — 80048 BASIC METABOLIC PNL TOTAL CA: CPT | Performed by: INTERNAL MEDICINE

## 2018-07-14 PROCEDURE — 93005 ELECTROCARDIOGRAM TRACING: CPT

## 2018-07-14 PROCEDURE — 74011250637 HC RX REV CODE- 250/637: Performed by: INTERNAL MEDICINE

## 2018-07-14 PROCEDURE — 36415 COLL VENOUS BLD VENIPUNCTURE: CPT | Performed by: INTERNAL MEDICINE

## 2018-07-14 RX ORDER — CLONIDINE HYDROCHLORIDE 0.1 MG/1
0.1 TABLET ORAL 2 TIMES DAILY
Qty: 60 TAB | Refills: 1 | Status: SHIPPED | OUTPATIENT
Start: 2018-07-14 | End: 2019-08-23 | Stop reason: SDUPTHER

## 2018-07-14 RX ORDER — SOTALOL HYDROCHLORIDE 80 MG/1
80 TABLET ORAL EVERY 12 HOURS
Qty: 60 TAB | Refills: 3 | Status: SHIPPED | OUTPATIENT
Start: 2018-07-14 | End: 2019-09-20

## 2018-07-14 RX ORDER — AMLODIPINE BESYLATE 10 MG/1
10 TABLET ORAL DAILY
Qty: 30 TAB | Refills: 1 | Status: SHIPPED | OUTPATIENT
Start: 2018-07-14 | End: 2019-09-20

## 2018-07-14 RX ORDER — LANOLIN ALCOHOL/MO/W.PET/CERES
400 CREAM (GRAM) TOPICAL DAILY
Qty: 30 TAB | Refills: 1 | Status: SHIPPED | OUTPATIENT
Start: 2018-07-14 | End: 2019-09-20

## 2018-07-14 RX ORDER — POTASSIUM CHLORIDE 1500 MG/1
20 TABLET, FILM COATED, EXTENDED RELEASE ORAL DAILY
Qty: 30 TAB | Refills: 1 | Status: SHIPPED | OUTPATIENT
Start: 2018-07-14 | End: 2019-09-20 | Stop reason: ALTCHOICE

## 2018-07-14 RX ORDER — LISINOPRIL 20 MG/1
20 TABLET ORAL DAILY
Qty: 30 TAB | Refills: 1 | Status: SHIPPED | OUTPATIENT
Start: 2018-07-14 | End: 2019-12-20 | Stop reason: DRUGHIGH

## 2018-07-14 RX ADMIN — CLONIDINE HYDROCHLORIDE 0.1 MG: 0.1 TABLET ORAL at 08:37

## 2018-07-14 RX ADMIN — PAROXETINE HYDROCHLORIDE 30 MG: 20 TABLET, FILM COATED ORAL at 08:37

## 2018-07-14 RX ADMIN — POTASSIUM CHLORIDE 40 MEQ: 750 TABLET, EXTENDED RELEASE ORAL at 08:37

## 2018-07-14 RX ADMIN — SOTALOL HYDROCHLORIDE 80 MG: 80 TABLET ORAL at 08:37

## 2018-07-14 RX ADMIN — AMLODIPINE BESYLATE 10 MG: 5 TABLET ORAL at 08:37

## 2018-07-14 RX ADMIN — Medication 400 MG: at 08:37

## 2018-07-14 RX ADMIN — ASPIRIN 81 MG: 81 TABLET, COATED ORAL at 08:38

## 2018-07-14 RX ADMIN — LISINOPRIL 20 MG: 20 TABLET ORAL at 08:37

## 2018-07-14 RX ADMIN — ACETAMINOPHEN 650 MG: 325 TABLET ORAL at 04:25

## 2018-07-14 NOTE — DISCHARGE INSTRUCTIONS
Two weeks follow up with Dr Roz Huitron Call office [phone] to arrange appt. Learning About ACE Inhibitors  Introduction    ACE (angiotensin-converting enzyme) inhibitors stop the release of an enzyme. This enzyme makes your blood vessels smaller. Without it, your blood vessels relax and get bigger. This lowers your blood pressure. These medicines also increase how much water and salt go into your urine. This also lowers blood pressure. You may take this kind of medicine if you have high blood pressure. Or you may take it if you have heart problems, kidney problems, or diabetes. If you have coronary artery disease, this medicine can help prevent heart attacks and strokes. Examples  · Benazepril (Lotensin)  · Lisinopril (Prinivil, Zestril)  · Ramipril (Altace)  This is not a complete list.  Possible side effects  Side effects may include:  · A cough. · Low blood pressure. This can make you feel dizzy or weak. · Too much potassium in your body. · Swelling. This may be a sign of an allergic reaction. You may have other side effects or reactions not listed here. Check the information that comes with your medicine. What to know about taking this medicine  · ACE inhibitors can cause a dry cough. Talk to your doctor if you have a dry cough. You may need a different medicine. · These medicines can cause an allergic reaction. This can cause a little swelling. Or it can cause red bumps on your skin that hurt. In rare cases, the swelling may make it hard for you to breathe. · Do not take this medicine if you are pregnant. And don't take it if you plan to become pregnant. · Take your medicines exactly as prescribed. Call your doctor if you think you are having a problem with your medicine. · Check with your doctor or pharmacist before you use any other medicines. This includes over-the-counter medicines. Make sure your doctor knows all of the medicines, vitamins, herbal products, and supplements you take.  Taking some medicines together can cause problems. · You may need regular blood tests. Where can you learn more? Go to http://karo-sirisha.info/. Enter P050 in the search box to learn more about \"Learning About ACE Inhibitors. \"  Current as of: December 6, 2017  Content Version: 11.7  © 1403-7402 [employer]. Care instructions adapted under license by [employer] (which disclaims liability or warranty for this information). If you have questions about a medical condition or this instruction, always ask your healthcare professional. [address] any warranty or liability for your use of this information. Home Blood Pressure Test: About This Test  What is it? A home blood pressure test allows you to keep track of your blood pressure at home. Blood pressure is a measure of the force of blood against the walls of your arteries. Blood pressure readings include two numbers, such as 130/80 (say \"130 over 80\"). The first number is the systolic pressure. The second number is the diastolic pressure. Why is this test done? You may do this test at home to:  · Find out if you have high blood pressure. · Track your blood pressure if you have high blood pressure. · Track how well medicine is working to reduce high blood pressure. · Check how lifestyle changes, such as weight loss and exercise, are affecting blood pressure. How can you prepare for the test?  · Do not use caffeine, tobacco, or medicines known to raise blood pressure (such as nasal decongestant sprays) for at least 30 minutes before taking your blood pressure. · Do not exercise for at least 30 minutes before taking your blood pressure. What happens before the test?  Take your blood pressure while you feel comfortable and relaxed.  Sit quietly with both feet on the floor for at least 5 minutes before the test.  What happens during the test?  · Sit with your arm slightly bent and resting on a table so that your upper arm is at the same level as your heart. · Roll up your sleeve or take off your shirt to expose your upper arm. · Wrap the blood pressure cuff around your upper arm so that the lower edge of the cuff is about 1 inch above the bend of your elbow. Proceed with the following steps depending on if you are using an automatic or manual pressure monitor. Automatic blood pressure monitors  · Press the on/off button on the automatic monitor and wait until the ready-to-measure \"heart\" symbol appears next to zero in the display window. · Press the start button. The cuff will inflate and deflate by itself. · Your blood pressure numbers will appear on the screen. · Write your numbers in your log book, along with the date and time. Manual blood pressure monitors  · Place the earpieces of a stethoscope in your ears, and place the bell of the stethoscope over the artery, just below the cuff. · Close the valve on the rubber inflating bulb. · Squeeze the bulb rapidly with your opposite hand to inflate the cuff until the dial or column of mercury reads about 30 mm Hg higher than your usual systolic pressure. If you do not know your usual pressure, inflate the cuff to 210 mm Hg or until the pulse at your wrist disappears. · Open the pressure valve just slightly by twisting or pressing the valve on the bulb. · As you watch the pressure slowly fall, note the level on the dial at which you first start to hear a pulsing or tapping sound through the stethoscope. This is your systolic blood pressure. · Continue letting the air out slowly. The sounds will become muffled and will finally disappear. Note the pressure when the sounds completely disappear. This is your diastolic blood pressure. Let out all the remaining air. · Write your numbers in your log book, along with the date and time.   What else should you know about the test?  Here are the categories of blood pressure for adults:  Ideal blood pressure. Systolic is less than 302, and diastolic is less than 80. Elevated blood pressure. Systolic is 956 to 264, and diastolic is less than 80. High blood pressure (hypertension). Systolic is 261 or above. Diastolic is 80 or above. One or both numbers may be high. It is more accurate to take the average of several readings made throughout the day than to rely on a single reading. Follow-up care is a key part of your treatment and safety. Be sure to make and go to all appointments, and call your doctor if you are having problems. It's also a good idea to keep a list of the medicines you take. Where can you learn more? Go to http://karo-sirisha.info/. Enter C427 in the search box to learn more about \"Home Blood Pressure Test: About This Test.\"  Current as of: December 6, 2017  Content Version: 11.7  © 4854-4105 [employer]. Care instructions adapted under license by [employer] (which disclaims liability or warranty for this information). If you have questions about a medical condition or this instruction, always ask your healthcare professional. [place] [address] any warranty or liability for your use of this information. Learning About ARBs  Introduction    ARBs (angiotensin II receptor blockers) block a hormone that makes blood vessels narrow. As a result, the blood vessels relax and widen. This lowers blood pressure. ARBs also put more water and salt into the urine. This also lowers blood pressure. ARBs can treat:  · High blood pressure. · Coronary artery disease. · Heart failure. They also may be used to help your kidneys when you have diabetes. Examples  ARBs include:  · Candesartan (Atacand). · Irbesartan (Avapro). · Losartan (Cozaar). This is not a complete list of all ARBs. Possible side effects  Side effects may include:  · Low blood pressure. You may feel dizzy and weak. · Diarrhea. · High potassium levels.   You may have other side effects or reactions not listed here. Check the information that comes with your medicine. What to know about taking this medicine  · ARBs may be used if you had a cough when you tried to take an ACE inhibitor. ARBs are less likely to cause a cough. · You may need regular blood tests. · Take your medicines exactly as prescribed. Call your doctor if you think you are having a problem with your medicine. · Tell your doctor or pharmacist all the medicines you take. This includes over-the-counter medicines, vitamins, herbal products, and supplements. Taking some medicines together can cause problems. · You should not take ARBs if you are pregnant or planning to become pregnant. Where can you learn more? Go to http://karo-sirisha.info/. Enter K212 in the search box to learn more about \"Learning About ARBs. \"  Current as of: December 6, 2017  Content Version: 11.7  © 7201-7134 [employer]. Care instructions adapted under license by [employer] (which disclaims liability or warranty for this information). If you have questions about a medical condition or this instruction, always ask your healthcare professional. [place] [address] any warranty or liability for your use of this information. Learning About Coronary Artery Disease (CAD)  What is coronary artery disease? Coronary artery disease (CAD) occurs when plaque builds up in the arteries that bring oxygen-rich blood to your heart. Plaque is a fatty substance made of cholesterol, calcium, and other substances in the blood. This process is called hardening of the arteries, or atherosclerosis. What happens when you have coronary artery disease? · Plaque may narrow the coronary arteries. Narrowed arteries cause poor blood flow. This can lead to angina symptoms such as chest pain or discomfort. If blood flow is completely blocked, you could have a heart attack.   · You can slow CAD and reduce the risk of future problems by making changes in your lifestyle. These include quitting smoking and eating heart-healthy foods. · Treatments for CAD, along with changes in your lifestyle, can help you live a longer and healthier life. How can you prevent coronary artery disease? · Do not smoke. It may be the best thing you can do to prevent heart disease. If you need help quitting, talk to your doctor about stop-smoking programs and medicines. These can increase your chances of quitting for good. · Be active. Get at least 30 minutes of exercise on most days of the week. Walking is a good choice. You also may want to do other activities, such as running, swimming, cycling, or playing tennis or team sports. · Eat heart-healthy foods. Eat more fruits and vegetables and less foods that contain saturated and trans fats. Limit alcohol, sodium, and sweets. · Stay at a healthy weight. Lose weight if you need to. · Manage other health problems such as diabetes, high blood pressure, and high cholesterol. · Manage stress. Stress can hurt your heart. To keep stress low, talk about your problems and feelings. Don't keep your feelings hidden. · If you have talked about it with your doctor, take a low-dose aspirin every day. Aspirin can help certain people lower their risk of a heart attack or stroke. But taking aspirin isn't right for everyone, because it can cause serious bleeding. Do not start taking daily aspirin unless your doctor knows about it. How is coronary artery disease treated? · Your doctor will suggest that you make lifestyle changes. For example, your doctor may ask you to eat healthy foods, quit smoking, lose extra weight, and be more active. · You will have to take medicines. · Your doctor may suggest a procedure to open narrowed or blocked arteries. This is called angioplasty. Or your doctor may suggest using healthy blood vessels to create detours around narrowed or blocked arteries. This is called bypass surgery. Follow-up care is a key part of your treatment and safety. Be sure to make and go to all appointments, and call your doctor if you are having problems. It's also a good idea to know your test results and keep a list of the medicines you take. Where can you learn more? Go to http://karo-sirisha.info/. Enter [phone] [id number] in the search box to learn more about \"Learning About Coronary Artery Disease (CAD). \"  Current as of: December 6, 2017  Content Version: 11.7  © 0045-4227 [employer]. Care instructions adapted under license by [employer] (which disclaims liability or warranty for this information). If you have questions about a medical condition or this instruction, always ask your healthcare professional. [address] any warranty or liability for your use of this information. Coronary Angiogram: What to Expect at [address] [place] [address]    A coronary angiogram is a test to examine the large blood vessel of your heart (coronary artery). The doctor inserted a thin, flexible tube (catheter) into a blood vessel in your groin. In some cases, the catheter is placed in a blood vessel in the arm. Your groin or arm may have a bruise and feel sore for a day or two after a coronary angiogram. You can do light activities around the house but nothing strenuous for several days. This care sheet gives you a general idea about how long it will take for you to recover. But each person recovers at a different pace. Follow the steps below to feel better as quickly as possible. How can you care for yourself at home? Activity    · If the doctor gave you a sedative:  ¨ For 24 hours, don't do anything that requires attention to detail. It takes time for the medicine's effects to completely wear off. ¨ For your safety, do not drive or operate any machinery that could be dangerous.  Wait until the medicine wears off and you can think clearly and react easily.     · Do not do strenuous exercise and do not lift, pull, or push anything heavy until your doctor says it is okay. This may be for a day or two. You can walk around the house and do light activity, such as cooking.     · If the catheter was placed in your groin, try not to walk up stairs for the first couple of days.     · If the catheter was placed in your arm near your wrist, do not bend your wrist deeply for the first couple of days. Be careful using your hand to get into and out of a chair or bed.     · If your doctor recommends it, get more exercise. Walking is a good choice. Bit by bit, increase the amount you walk every day. Try for at least 30 minutes on most days of the week. Diet    · Drink plenty of fluids to help your body flush out the dye. If you have kidney, heart, or liver disease and have to limit fluids, talk with your doctor before you increase the amount of fluids you drink.     · Keep eating a heart-healthy diet that has lots of fruits, vegetables, and whole grains. If you have not been eating this way, talk to your doctor. You also may want to talk to a dietitian. This expert can help you to learn about healthy foods and plan meals. Medicines    · Your doctor will tell you if and when you can restart your medicines. He or she will also give you instructions about taking any new medicines.     · If you take blood thinners, such as warfarin (Coumadin), clopidogrel (Plavix), or aspirin, be sure to talk to your doctor. He or she will tell you if and when to start taking those medicines again. Make sure that you understand exactly what your doctor wants you to do.     · Your doctor may prescribe a blood-thinning medicine like aspirin or clopidogrel (Plavix). It is very important that you take these medicines exactly as directed in order to keep the coronary artery open and reduce your risk of a heart attack. Be safe with medicines.  Call your doctor if you think you are having a problem with your medicine.    Care of the catheter site    · For 1 or 2 days, keep a bandage over the spot where the catheter was inserted. The bandage probably will fall off in this time.     · Put ice or a cold pack on the area for 10 to 20 minutes at a time to help with soreness or swelling. Put a thin cloth between the ice and your skin.     · You may shower 24 to 48 hours after the procedure, if your doctor okays it. Pat the incision dry.     · Do not soak the catheter site until it is healed. Don't take a bath for 1 week, or until your doctor tells you it is okay.     · If you are bleeding, lie down and press on the area for 15 minutes to try to make it stop. If the bleeding does not stop, call your doctor or seek immediate medical care. Follow-up care is a key part of your treatment and safety. Be sure to make and go to all appointments, and call your doctor if you are having problems. It's also a good idea to know your test results and keep a list of the medicines you take. When should you call for help? Call 911 anytime you think you may need emergency care. For example, call if:    · You passed out (lost consciousness).     · You have severe trouble breathing.     · You have sudden chest pain and shortness of breath, or you cough up blood.     · You have symptoms of a heart attack. These may include:  ¨ Chest pain or pressure, or a strange feeling in the chest.  ¨ Sweating. ¨ Shortness of breath. ¨ Nausea or vomiting. ¨ Pain, pressure, or a strange feeling in the back, neck, jaw, or upper belly, or in one or both shoulders or arms. ¨ Lightheadedness or sudden weakness. ¨ A fast or irregular heartbeat. After you call 911, the  may tel you to chew 1 adult-strength or 2 to 4 low-dose aspirin. Wait for an ambulance.  Do not try to drive yourself.     · You have been diagnosed with angina, and you have symptoms that do not go away with rest or are not getting better within 5 minutes after you take a dose of nitroglycerin.    Call your doctor now or seek immediate medical care if:    · You are bleeding from the area where the catheter was put in your artery.     · You have a fast-growing, painful lump at the catheter site.     · You have signs of infection, such as:  ¨ Increased pain, swelling, warmth, or redness. ¨ Red streaks leading from the catheter site. ¨ Pus draining from the catheter site. ¨ A fever.     · Your leg or arm looks blue or feels cold, numb, or tingly.    Watch closely for changes in your health, and be sure to contact your doctor if you have any problems. Where can you learn more? Go to http://karo-sirisha.info/. Enter Z001 in the search box to learn more about \"Coronary Angiogram: What to Expect at Home. \"  Current as of: December 6, 2017  Content Version: 11.7  © 1890-7070 [employer]. Care instructions adapted under license by [employer] (which disclaims liability or warranty for this information). If you have questions about a medical condition or this instruction, always ask your healthcare professional. [place] [address] any warranty or liability for your use of this information.

## 2018-07-14 NOTE — PROGRESS NOTES
Pt seen as he was being discharged. He feels well. Going home on Sotalol, and to f/u with Dr. Ivy Alford per his recommendations.

## 2018-07-14 NOTE — PROGRESS NOTES
Pt has been discharged by MD.  Samreen Wilson over discharge instructions with pt and wife. No questions. MD called prescriptions into pharmacy. Took out iv. Pt wheeled down to discharge area.

## 2018-07-14 NOTE — DISCHARGE SUMMARY
[id number]  MR#: [id number]  : 1945  ACCOUNT #: [de-identified]   ADMIT DATE: 07/10/2018  DISCHARGE DATE: 2018    DISCHARGE DIAGNOSES:  1. Ventricular tachycardia storm. 2.  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 3.  Hypokalemia. 4.  Coronary artery disease, stable. 5.  Hypertension, improved. HOSPITAL COURSE:  The patient was admitted to the hospital after experiencing a series of shocks from his ICD, which were appropriate for the settings that were in place. He was also noted to be hypokalemic and mildly hypomagnesemic. Prior to this, his magnesium level was in the normal range. He was placed on an IV amiodarone drip and this was supplemented with an IV lidocaine drip and his ventricular arrhythmias subsided. He was seen by Dr. Rafael Saxena for electrophysiology. Several changes were made, including changes on his device so the threshold for therapies will be higher. Medication modifications were made as well. The patient was on amiodarone; however, in the past, attempts to increase his amiodarone dose were tolerated poorly. His blood pressures are also quite high while he is here. Blood pressure is now much better controlled. He had elevated cardiac enzymes, likely due to the 45 ICD shocks he received. However, because of his VT presentation, he did have coronary angiography again which demonstrated that his obtuse marginal stent was widely patent. He otherwise had nonocclusive disease with a preserved ejection fraction. For the past 2 days, we have been monitoring his QT intervals as his sotalol was started and titrating his blood pressure medications. The patient is doing well this morning and is ready for discharge.   Necessarily, he is quite anxious because of the overwhelming number ICD shocks he received in a short period of time and is afraid this is going to happen again. We talked about this and I explained the strategy to reduce and prevent this from occurring again. PHYSICAL EXAMINATION:  VITAL SIGNS:   Blood pressure is 140/92, heart rate 60 respirations 11, sats 96% on room air. HEENT:  Unremarkable. NECK:  Supple without adenopathy. LUNGS:  Clear to auscultation and percussion. CARDIOVASCULAR:  Regular moderate rhythm. No S3, gallop, or friction rub. ABDOMEN:  Soft and nontender. EXTREMITIES:  No edema. Normal pulses. NEUROLOGIC:  The patient is awake, alert, appropriate. DISCHARGE MEDICATIONS:  The patient will be discharged on the following medications:  Amlodipine 10 mg daily, aspirin 81 mg daily, atorvastatin 20 mg nightly, clonidine 0.1 mg twice a day, Kathe Q p.o. as directed, lisinopril 20 mg daily, magnesium oxide 400 mg daily, paroxetine 30 mg every morning, potassium 20 mEq daily, and sotalol 80 mg every 12 hours. He has been asked to stop amiodarone and the carvedilol. He will follow up with Dr. Neha Cherry in about 2 weeks and he will follow up with his primary care physician, Dr. Maximino Hollins, at Dr. Héctor Turner direction. DISPOSITION:  The patient is doing well this morning and is ready for discharge.       MD ANGELO Waller/YEFRI  D: 07/14/2018 08:10     T: 07/14/2018 09:58  JOB #: [id number]

## 2018-07-14 NOTE — PROGRESS NOTES
Mr Jolee Landau feels well this morning  His BP is much improved  No VT noted on his alarms  On today's EKG his QTc is 502 ms.  This is less than yesterday's 520 ms  Discharge home  Follow up with PCP and Dr Verba Hamman summary dictated # [id number]  Total time for discharge administration: 30 minutes

## 2018-07-14 NOTE — PROGRESS NOTES
[facility] Report included the following information SBAR, Kardex, Procedure Summary, Intake/Output, MAR and Recent Results.

## 2019-09-20 PROBLEM — I47.20 VENTRICULAR TACHYCARDIA: Status: RESOLVED | Noted: 2018-07-10 | Resolved: 2019-09-20

## 2019-12-22 PROBLEM — I10 ESSENTIAL HYPERTENSION: Status: ACTIVE | Noted: 2019-12-22

## 2020-07-21 PROBLEM — F33.41 RECURRENT MAJOR DEPRESSIVE DISORDER, IN PARTIAL REMISSION (HCC): Status: ACTIVE | Noted: 2020-07-21

## 2021-03-08 ENCOUNTER — OFFICE VISIT (OUTPATIENT)
Dept: FAMILY MEDICINE CLINIC | Age: 76
End: 2021-03-08
Payer: MEDICARE

## 2021-03-08 VITALS
HEART RATE: 70 BPM | SYSTOLIC BLOOD PRESSURE: 150 MMHG | DIASTOLIC BLOOD PRESSURE: 92 MMHG | TEMPERATURE: 97.5 F | OXYGEN SATURATION: 97 % | RESPIRATION RATE: 18 BRPM | WEIGHT: 202.38 LBS | HEIGHT: 72 IN | BODY MASS INDEX: 27.41 KG/M2

## 2021-03-08 DIAGNOSIS — T16.1XXA FOREIGN BODY OF RIGHT EAR, INITIAL ENCOUNTER: Primary | ICD-10-CM

## 2021-03-08 DIAGNOSIS — H61.23 BILATERAL IMPACTED CERUMEN: ICD-10-CM

## 2021-03-08 DIAGNOSIS — H61.23 HEARING LOSS OF BOTH EARS DUE TO CERUMEN IMPACTION: ICD-10-CM

## 2021-03-08 PROCEDURE — 69200 CLEAR OUTER EAR CANAL: CPT | Performed by: FAMILY MEDICINE

## 2021-03-08 PROCEDURE — G8419 CALC BMI OUT NRM PARAM NOF/U: HCPCS | Performed by: FAMILY MEDICINE

## 2021-03-08 PROCEDURE — 69210 REMOVE IMPACTED EAR WAX UNI: CPT | Performed by: FAMILY MEDICINE

## 2021-03-08 PROCEDURE — 1101F PT FALLS ASSESS-DOCD LE1/YR: CPT | Performed by: FAMILY MEDICINE

## 2021-03-08 PROCEDURE — G9717 DOC PT DX DEP/BP F/U NT REQ: HCPCS | Performed by: FAMILY MEDICINE

## 2021-03-08 PROCEDURE — G8536 NO DOC ELDER MAL SCRN: HCPCS | Performed by: FAMILY MEDICINE

## 2021-03-08 PROCEDURE — 99202 OFFICE O/P NEW SF 15 MIN: CPT | Performed by: FAMILY MEDICINE

## 2021-03-08 PROCEDURE — G8753 SYS BP > OR = 140: HCPCS | Performed by: FAMILY MEDICINE

## 2021-03-08 PROCEDURE — G8755 DIAS BP > OR = 90: HCPCS | Performed by: FAMILY MEDICINE

## 2021-03-08 PROCEDURE — G8427 DOCREV CUR MEDS BY ELIG CLIN: HCPCS | Performed by: FAMILY MEDICINE

## 2021-03-08 NOTE — PROGRESS NOTES
1. Have you been to the ER, urgent care clinic since your last visit? Hospitalized since your last visit? No    2. Have you seen or consulted any other health care providers outside of the [address] [place] [address] since your last visit? Include any pap smears or colon screening. Yes Where: DR Ingrid Gorman (Psychiatry) & Cardiology Dr Blanchie Mcburney     3/8/2021      Chief Complaint   Patient presents with    Foreign Body Removal     Foreign removal in Right Ear          History of Present Illness:        Inez Correa is a 66 y.o. male lost the soft ear piece of his R hearing aid in the ear canal this am. No pain. Allergies   Allergen Reactions    Codeine Nausea and Vomiting    Penicillins Rash     As a child. Current Outpatient Medications   Medication Sig    PARoxetine (PAXIL) 40 mg tablet TAKE 1 TABLET BY MOUTH ONCE DAILY    sotaloL (BETAPACE) 160 mg tablet TAKE 1 TABLET BY MOUTH EVERY 12 HOURS    atorvastatin (LIPITOR) 40 mg tablet TAKE 1 TABLET BY MOUTH EVERY DAY    aspirin delayed-release 81 mg tablet Take 81 mg by mouth daily.  PARoxetine (PAXIL) 30 mg tablet TAKE 1 TABLET BY MOUTH EVERY MORNING    L. acidophilus/Strept/La p-ryann (REMA-Q PO) Take 1 Cap by mouth daily. No current facility-administered medications for this visit. Physical Examination:    Visit Vitals  BP (!) 150/92 (BP 1 Location: Left upper arm, BP Patient Position: Sitting, BP Cuff Size: Adult)   Pulse 70   Temp 97.5 °F (36.4 °C) (Oral)   Resp 18   Ht 6' (1.829 m)   Wt 202 lb 6 oz (91.8 kg)   SpO2 97%   BMI 27.45 kg/m²      General:  Alert, cooperative, no distress. HEENT:  Normocephalic, without obvious abnormality, atraumatic. Conjunctivae/corneas clear. Pupils equal, round, reactive to light. Extraocular movements intact. L TM intact when wax cleared. R canal occluded by cerumen         ASSESSMENT AND PLAN    1.  Foreign body of right ear, initial encounter  Removed with forceps intact  - REMV EXT CANAL FOREIGN BODY    2. Hearing loss of both ears due to cerumen impaction      3. Bilateral impacted cerumen  L cleared, R irrigated until canal edema occurred.  Will use ceruminex for a week and return for irrigation  - REMOVAL IMPACTED CERUMEN INSTRUMENTATION UNILAT  - REMV EXT CANAL FOREIGN BODY          Orders Placed This Encounter    [facility] [address] IMPACTED  [address]     RTC 10 days      Anna Wilson MD

## 2021-03-12 ENCOUNTER — TELEPHONE (OUTPATIENT)
Dept: FAMILY MEDICINE CLINIC | Age: 76
End: 2021-03-12

## 2021-08-03 PROBLEM — R42 DIZZINESS OF UNKNOWN CAUSE: Status: RESOLVED | Noted: 2018-03-04 | Resolved: 2021-08-03

## 2022-01-07 PROBLEM — G30.9 ALZHEIMER'S DISEASE, UNSPECIFIED (CODE) (HCC): Status: ACTIVE | Noted: 2022-01-07

## 2022-03-17 PROBLEM — G30.9 ALZHEIMER'S DISEASE, UNSPECIFIED (CODE) (HCC): Status: RESOLVED | Noted: 2022-01-07 | Resolved: 2022-03-17

## 2022-03-18 PROBLEM — E78.49 OTHER HYPERLIPIDEMIA: Status: ACTIVE | Noted: 2018-07-11

## 2022-03-18 PROBLEM — I10 ESSENTIAL HYPERTENSION: Status: ACTIVE | Noted: 2019-12-22

## 2022-03-19 PROBLEM — R42 DIZZINESS: Status: ACTIVE | Noted: 2018-03-04

## 2022-03-19 PROBLEM — F33.41 RECURRENT MAJOR DEPRESSIVE DISORDER, IN PARTIAL REMISSION (HCC): Status: ACTIVE | Noted: 2020-07-21

## 2022-03-20 PROBLEM — G30.9 ALZHEIMER'S DISEASE, UNSPECIFIED (CODE) (HCC): Status: RESOLVED | Noted: 2022-01-07 | Resolved: 2022-03-17

## 2022-03-20 PROBLEM — G44.89 OTHER HEADACHE SYNDROME: Status: ACTIVE | Noted: 2018-03-05

## 2022-03-20 PROBLEM — I95.1 ORTHOSTATIC HYPOTENSION: Status: ACTIVE | Noted: 2018-04-30

## 2022-04-11 ENCOUNTER — HOSPITAL ENCOUNTER (OUTPATIENT)
Dept: VASCULAR SURGERY | Age: 77
Discharge: HOME | End: 2022-04-11
Attending: INTERNAL MEDICINE
Payer: MEDICARE

## 2022-04-11 DIAGNOSIS — G45.9 TIA (TRANSIENT ISCHEMIC ATTACK): ICD-10-CM

## 2022-04-11 LAB
LEFT CCA DIST DIAS: 19.2 CM/S
LEFT CCA DIST SYS: 79.3 CM/S
LEFT CCA PROX DIAS: 20 CM/S
LEFT CCA PROX SYS: 102.7 CM/S
LEFT ECA DIAS: 16.7 CM/S
LEFT ECA SYS: 128.6 CM/S
LEFT ICA DIST DIAS: 27 CM/S
LEFT ICA DIST SYS: 74.3 CM/S
LEFT ICA MID DIAS: 23.7 CM/S
LEFT ICA MID SYS: 74.3 CM/S
LEFT ICA PROX DIAS: 9.5 CM/S
LEFT ICA PROX SYS: 53.4 CM/S
LEFT ICA/CCA SYS: 0.9 NO UNITS
LEFT VERTEBRAL DIAS: 8.9 CM/S
LEFT VERTEBRAL SYS: 34.6 CM/S
RIGHT CCA DIST DIAS: 14.5 CM/S
RIGHT CCA DIST SYS: 65.7 CM/S
RIGHT CCA PROX DIAS: 20.3 CM/S
RIGHT CCA PROX SYS: 93.3 CM/S
RIGHT ECA DIAS: 16.6 CM/S
RIGHT ECA SYS: 96.7 CM/S
RIGHT ICA DIST DIAS: 23.9 CM/S
RIGHT ICA DIST SYS: 76 CM/S
RIGHT ICA MID DIAS: 29.4 CM/S
RIGHT ICA MID SYS: 73.2 CM/S
RIGHT ICA PROX DIAS: 15.6 CM/S
RIGHT ICA PROX SYS: 60.4 CM/S
RIGHT ICA/CCA SYS: 1.2 NO UNITS
RIGHT VERTEBRAL DIAS: 12.5 CM/S
RIGHT VERTEBRAL SYS: 34 CM/S

## 2022-04-11 PROCEDURE — 93880 EXTRACRANIAL BILAT STUDY: CPT

## 2022-07-19 ENCOUNTER — HOSPITAL ENCOUNTER (EMERGENCY)
Age: 77
Discharge: HOME | End: 2022-07-19
Attending: EMERGENCY MEDICINE
Payer: MEDICARE

## 2022-07-19 VITALS
TEMPERATURE: 98.4 F | BODY MASS INDEX: 26.41 KG/M2 | SYSTOLIC BLOOD PRESSURE: 145 MMHG | HEIGHT: 72 IN | DIASTOLIC BLOOD PRESSURE: 88 MMHG | HEART RATE: 67 BPM | WEIGHT: 195 LBS | OXYGEN SATURATION: 99 % | RESPIRATION RATE: 18 BRPM

## 2022-07-19 DIAGNOSIS — S61.219A LACERATION OF MULTIPLE SITES OF RIGHT HAND AND FINGERS, INITIAL ENCOUNTER: Primary | ICD-10-CM

## 2022-07-19 DIAGNOSIS — S61.411A LACERATION OF MULTIPLE SITES OF RIGHT HAND AND FINGERS, INITIAL ENCOUNTER: Primary | ICD-10-CM

## 2022-07-19 PROCEDURE — 90714 TD VACC NO PRESV 7 YRS+ IM: CPT | Performed by: EMERGENCY MEDICINE

## 2022-07-19 PROCEDURE — 74011000250 HC RX REV CODE- 250: Performed by: EMERGENCY MEDICINE

## 2022-07-19 PROCEDURE — 74011250636 HC RX REV CODE- 250/636: Performed by: EMERGENCY MEDICINE

## 2022-07-19 PROCEDURE — 90471 IMMUNIZATION ADMIN: CPT

## 2022-07-19 PROCEDURE — 74011250637 HC RX REV CODE- 250/637: Performed by: EMERGENCY MEDICINE

## 2022-07-19 PROCEDURE — 75810000293 HC SIMP/SUPERF WND  RPR

## 2022-07-19 PROCEDURE — 99284 EMERGENCY DEPT VISIT MOD MDM: CPT

## 2022-07-19 PROCEDURE — 77030010507 HC ADH SKN DERMBND J&J -B

## 2022-07-19 RX ORDER — LIDOCAINE HYDROCHLORIDE 10 MG/ML
10 INJECTION INFILTRATION; PERINEURAL ONCE
Status: COMPLETED | OUTPATIENT
Start: 2022-07-19 | End: 2022-07-19

## 2022-07-19 RX ORDER — CEPHALEXIN 250 MG/1
500 CAPSULE ORAL
Status: COMPLETED | OUTPATIENT
Start: 2022-07-19 | End: 2022-07-19

## 2022-07-19 RX ORDER — CEPHALEXIN 500 MG/1
500 CAPSULE ORAL 3 TIMES DAILY
Qty: 9 CAPSULE | Refills: 0 | Status: SHIPPED | OUTPATIENT
Start: 2022-07-19 | End: 2022-07-22

## 2022-07-19 RX ORDER — ACETAMINOPHEN 500 MG
1000 TABLET ORAL
Status: COMPLETED | OUTPATIENT
Start: 2022-07-19 | End: 2022-07-19

## 2022-07-19 RX ADMIN — ACETAMINOPHEN 1000 MG: 500 TABLET ORAL at 12:47

## 2022-07-19 RX ADMIN — CEPHALEXIN 500 MG: 250 CAPSULE ORAL at 14:10

## 2022-07-19 RX ADMIN — CLOSTRIDIUM TETANI TOXOID ANTIGEN (FORMALDEHYDE INACTIVATED) AND CORYNEBACTERIUM DIPHTHERIAE TOXOID ANTIGEN (FORMALDEHYDE INACTIVATED) 0.5 ML: 5; 2 INJECTION, SUSPENSION INTRAMUSCULAR at 14:06

## 2022-07-19 RX ADMIN — LIDOCAINE HYDROCHLORIDE 10 ML: 10 INJECTION, SOLUTION INFILTRATION; PERINEURAL at 12:47

## 2022-07-19 NOTE — ED NOTES
Laceration on right pointer and pinky finger cleaned with surecleanse.   No bleeding noted at this time

## 2022-07-19 NOTE — ED PROVIDER NOTES
EMERGENCY DEPARTMENT HISTORY AND PHYSICAL EXAM          Date: 7/19/2022  Patient Name: Michael Rivers    History of Presenting Illness     Chief Complaint   Patient presents with    Laceration       History Provided By: Patient    HPI: Michael Rivers is a 76 y.o. male, pmhx Carlee Blare artery disease, GERD, BPH, cva, who presents ambulatory to the ED c/o laceration    Patient explains after finishing yard work he was hanging up gauri in the shed when it slipped down and cut his right hand. He states it bled extensively as he is on blood thinners but they wrapped it and came directly to the ER. Has not yet washed it. Does not remember his last tetanus shot. Patient specifically denies any recent fevers, chills, nausea, vomiting, diarrhea, abd pain, CP, SOB, urinary sxs, changes in BM, or headache. PCP: Mariangel Alvarado MD    Allergies: Codeine and penicillin    There are no other complaints, changes, or physical findings at this time. Current Outpatient Medications   Medication Sig Dispense Refill    cephALEXin (Keflex) 500 mg capsule Take 1 Capsule by mouth three (3) times daily for 3 days. 9 Capsule 0    sotaloL (BETAPACE) 160 mg tablet TAKE 1 TABLET BY MOUTH EVERY 12 HOURS 180 Tablet 3    clopidogreL (Plavix) 75 mg tab Take 1 Tablet by mouth daily. 90 Tablet 3    PARoxetine (PAXIL) 40 mg tablet Take 1.5 Tablets by mouth daily. 135 Tablet 3    atorvastatin (LIPITOR) 40 mg tablet Take 1 Tablet by mouth daily. 90 Tablet 3    aspirin delayed-release 81 mg tablet Take 81 mg by mouth daily.          Past History     Past Medical History:  Past Medical History:   Diagnosis Date    BPH (benign prostatic hyperplasia)     s/p TURP    CAD (coronary artery disease)     stent to cx 2014    Cancer [facility]) 1985    superficial spreading melanoma    CVA (cerebral vascular accident) ([facility] [address]     Essential hypertension 12/22/2019    GERD (gastroesophageal reflux disease)     Hypertension     Orthostatic hypotension 2018    Dx'ed by tilt table test spring 2018    JOSE MANUEL on CPAP     Psychiatric disorder ,     situational depression       Past Surgical History:  Past Surgical History:   Procedure Laterality Date    HX COLONOSCOPY      about  or     HX IMPLANTABLE CARDIOVERTER DEFIBRILLATOR      HX ORTHOPAEDIC      C5/C6 fusion, bone spur    HX PROSTATECTOMY      TURP    HX TURP      [place] CARDIAC SURG PROCEDURE UNLIST  2015    BHUPINDER stent       Family History:  Family History   Problem Relation Age of Onset    Hypertension Mother     Psychiatric Disorder Mother     Heart Disease Father 80    Elevated Lipids Sister     Psychiatric Disorder Sister     Elevated Lipids Brother     Psychiatric Disorder Brother        Social History:  Social History     Tobacco Use    Smoking status: Former     Types: Cigarettes     Quit date: 2014     Years since quittin.0    Smokeless tobacco: Former     Quit date: 2010   Substance Use Topics    Alcohol use: Not Currently     Alcohol/week: 8.0 standard drinks     Types: 8 Shots of liquor per week     Comment: vodka or bourbon    Drug use: No       Allergies: Allergies   Allergen Reactions    Codeine Nausea and Vomiting    Penicillins Rash     As a child. Review of Systems   Review of Systems   Constitutional:  Negative for activity change, appetite change, chills, fever and unexpected weight change. HENT:  Negative for congestion. Eyes:  Negative for pain and visual disturbance. Respiratory:  Negative for cough and shortness of breath. Cardiovascular:  Negative for chest pain. Gastrointestinal:  Negative for abdominal pain, diarrhea, nausea and vomiting. Genitourinary:  Negative for dysuria. Musculoskeletal:  Negative for back pain. Skin:  Positive for wound. Negative for rash. Neurological:  Negative for headaches. Physical Exam   Physical Exam  Constitutional:       Appearance: Normal appearance. Comments:  This is a healthy-appearing elderly male, sitting comfortably with mildly elevated blood pressure, in mild acute distress   Eyes:      Extraocular Movements: Extraocular movements intact. Pupils: Pupils are equal, round, and reactive to light. Cardiovascular:      Rate and Rhythm: Normal rate. Pulses: Normal pulses. Musculoskeletal:         General: No deformity. Normal range of motion. Skin:     Comments: Right hand with multiple lacerations. There is a 1.5 cm laceration on the palmar aspect of the proximal fifth finger; there is a 1.5 cm avulsive type laceration on the palmar aspect of the tip of the second finger; there are multiple V-shaped lacerations on the proximal aspect of the second finger in total length this laceration equals 6 cm   Neurological:      General: No focal deficit present. Mental Status: He is alert. Comments: Patient with intermittent confusion during exam which he states is baseline     Diagnostic Study Results     Labs -   No results found for this or any previous visit (from the past 12 hour(s)). Radiologic Studies -   No orders to display     CT Results  (Last 48 hours)      None          CXR Results  (Last 48 hours)      None              Medical Decision Making   I am the first provider for this patient. I reviewed the vital signs, available nursing notes, past medical history, past surgical history, family history and social history. Vital Signs-Reviewed the patient's vital signs. Patient Vitals for the past 24 hrs:   Temp Pulse Resp BP SpO2   07/19/22 1229 98.4 °F (36.9 °C) 67 18 (!) 145/88 99 %     Pulse Oximetry Analysis - 99% on RA    Records Reviewed: Nursing Notes    Provider Notes (Medical Decision Making):   MDM: Elderly male presents to the ER with multiple lacerations and will require repair. He is unsure of his tetanus shot and there is nothing in the system so we will update his tetanus today.   Given he cut them on gauri it had been used in the garden and that he is covered in dirt, we will also initiate antibiotics to prevent infection. ED Course:   Initial assessment performed. The patients presenting problems have been discussed, and they are in agreement with the care plan formulated and outlined with them. I have encouraged them to ask questions as they arise throughout their visit. PROGRESS NOTE:  Procedure Note - Digital Block:   1:30PM  Performed by: Jasmyne Ambrocio MD   Bupivacaine 0.25% without epinephrine used to perform digital block of Right index finger(s). The procedure took 1-15 minutes, and pt tolerated well. Procedure Note - Laceration Repair:  2PM  Procedure by Jasymne Ambrocio MD .  Complexity: moderate  5th digit 1.5cm  linear laceration was irrigated copiously with NS under jet lavage, prepped with Betadine and draped in a sterile fashion. The wound was anesthestized with local infiltration of lidocaine 1% plain (2ml). The wound was repaired with One layer suture closure: Skin Layer:  2 sutures placed, stitch type:simple interrupted, suture: 4-0 nylon. .  The wound was closed with good hemostasis and approximation. Sterile dressing applied. 2. 2nd finger 1.5cm \"V\" shaped avulsion was irrigated copiously with NS under jet lavage, prepped with Betadine and draped in a sterile fashion. The wound was anesthestized with local infiltration of lidocaine 1% plain (2ml). The wound was repaired with One layer suture closure: Skin Layer:  1 sutures placed, stitch type:simple interrupted, suture: 4-0 nylon. .  The wound was closed with good hemostasis and approximation. Sterile dressing applied. 3. 2nd digit 6cm (there are 4 lacerations all ajoining each other at the mid finger was irrigated copiously with NS under jet lavage, prepped with Betadine and draped in a sterile fashion. The wound was anesthetized using digital block as above.  The wound was repaired with One layer suture closure: Skin Layer:  8 sutures placed, stitch type:simple interrupted, suture: 4-0 nylon. Dermabond was then used along the base of the finger at a superficial laceration (1cm) abutting the end of the wound. The wound was closed with good hemostasis and approximation. Sterile dressing applied. Estimated blood loss: 10ml  The procedure took 46-60 minutes, and pt tolerated well. Discharge note:  Pt re-evaluated and noted to be feeling better, ready for discharge. Will follow up as instructed for suture removal. All questions have been answered, pt voiced understanding and agreement with plan. Abx were prescribed, pt advised that diarrhea and rash are possible side effects of the medications. Specific return precautions provided as well as instructions to return to the ED should sx worsen at any time. Vital signs stable for discharge. Critical Care Time:   0      Diagnosis     Clinical Impression:   1. Laceration of multiple sites of right hand and fingers, initial encounter        PLAN:  1. Discharge Medication List as of 7/19/2022  2:08 PM        START taking these medications    Details   cephALEXin (Keflex) 500 mg capsule Take 1 Capsule by mouth three (3) times daily for 3 days. , Normal, Disp-9 Capsule, R-0           CONTINUE these medications which have NOT CHANGED    Details   sotaloL (BETAPACE) 160 mg tablet TAKE 1 TABLET BY MOUTH EVERY 12 HOURS, Normal, Disp-180 Tablet, R-3      clopidogreL (Plavix) 75 mg tab Take 1 Tablet by mouth daily. , Normal, Disp-90 Tablet, R-3      PARoxetine (PAXIL) 40 mg tablet Take 1.5 Tablets by mouth daily. , Normal, Disp-135 Tablet, R-3      atorvastatin (LIPITOR) 40 mg tablet Take 1 Tablet by mouth daily. , Normal, Disp-90 Tablet, R-3      aspirin delayed-release 81 mg tablet Take 81 mg by mouth daily. , Historical Med           2.    Follow-up Information       Follow up With Specialties Details Why Contact Osvaldo Alvarado MD Internal Medicine Physician Schedule an appointment as soon as possible for a visit  for wound recheck and suture removal for next Monday or Tuesday [phone] Emergency Medicine  If symptoms worsen with swelling, redness or drainage [facility]  [phone]          Return to ED if worse     Disposition:  Home       Please note, this dictation was completed with [employer], the computer voice recognition software. Quite often unanticipated grammatical, syntax, homophones, and other interpretive errors are inadvertently transcribed by the computer software. Please disregard these errors. Please excuse any errors that have escaped final proof reading.

## 2022-07-19 NOTE — ED NOTES
Non stick dressing placed on pointer finger and tube gauze used.   No bacitracin applied per Dr. Kristyn Watkins due to she also used dermabond

## 2022-07-19 NOTE — ED TRIAGE NOTES
Pt arrived by POV with laceration to right hand/fingers. Pt reports he was putting away the hedge gauri when he cut his hand. Pt states his tetanus is not up to date.   Pt is awake alert and oriented x 4, pt educated on Er flow

## 2022-07-27 PROBLEM — D69.2 SENILE PURPURA (HCC): Status: ACTIVE | Noted: 2022-07-27

## 2022-11-17 PROBLEM — I10 PRIMARY HYPERTENSION: Status: ACTIVE | Noted: 2019-12-22

## 2023-05-31 ENCOUNTER — HOSPITAL ENCOUNTER (EMERGENCY)
Facility: HOSPITAL | Age: 78
Discharge: HOME | End: 2023-05-31
Attending: EMERGENCY MEDICINE
Payer: MEDICARE

## 2023-05-31 ENCOUNTER — APPOINTMENT (OUTPATIENT)
Facility: HOSPITAL | Age: 78
End: 2023-05-31
Payer: MEDICARE

## 2023-05-31 VITALS
OXYGEN SATURATION: 97 % | RESPIRATION RATE: 12 BRPM | WEIGHT: 199.3 LBS | DIASTOLIC BLOOD PRESSURE: 105 MMHG | HEIGHT: 72 IN | HEART RATE: 65 BPM | SYSTOLIC BLOOD PRESSURE: 165 MMHG | BODY MASS INDEX: 26.99 KG/M2 | TEMPERATURE: 97.8 F

## 2023-05-31 DIAGNOSIS — I10 ESSENTIAL HYPERTENSION: ICD-10-CM

## 2023-05-31 DIAGNOSIS — R07.89 CHEST DISCOMFORT: Primary | ICD-10-CM

## 2023-05-31 LAB
ALBUMIN SERPL-MCNC: 3.5 G/DL (ref 3.5–5)
ALBUMIN/GLOB SERPL: 0.9 (ref 1.1–2.2)
ALP SERPL-CCNC: 85 U/L (ref 45–117)
ALT SERPL-CCNC: 31 U/L (ref 12–78)
ANION GAP SERPL CALC-SCNC: 8 MMOL/L (ref 5–15)
AST SERPL-CCNC: 24 U/L (ref 15–37)
BASOPHILS # BLD: 0 K/UL (ref 0–0.1)
BASOPHILS NFR BLD: 1 % (ref 0–1)
BILIRUB SERPL-MCNC: 0.4 MG/DL (ref 0.2–1)
BUN SERPL-MCNC: 19 MG/DL (ref 6–20)
BUN/CREAT SERPL: 17 (ref 12–20)
CALCIUM SERPL-MCNC: 8.9 MG/DL (ref 8.5–10.1)
CHLORIDE SERPL-SCNC: 107 MMOL/L (ref 97–108)
CO2 SERPL-SCNC: 27 MMOL/L (ref 21–32)
CREAT SERPL-MCNC: 1.09 MG/DL (ref 0.7–1.3)
DIFFERENTIAL METHOD BLD: NORMAL
EKG DIAGNOSIS: NORMAL
EKG Q-T INTERVAL: 460 MS
EKG QRS DURATION: 142 MS
EKG QTC CALCULATION (BAZETT): 517 MS
EKG R AXIS: -27 DEGREES
EKG T AXIS: 32 DEGREES
EKG VENTRICULAR RATE: 76 BPM
EOSINOPHIL # BLD: 0.2 K/UL (ref 0–0.4)
EOSINOPHIL NFR BLD: 3 % (ref 0–7)
ERYTHROCYTE [DISTWIDTH] IN BLOOD BY AUTOMATED COUNT: 13.4 % (ref 11.5–14.5)
GLOBULIN SER CALC-MCNC: 4.1 G/DL (ref 2–4)
GLUCOSE SERPL-MCNC: 154 MG/DL (ref 65–100)
HCT VFR BLD AUTO: 40.3 % (ref 36.6–50.3)
HGB BLD-MCNC: 13 G/DL (ref 12.1–17)
IMM GRANULOCYTES # BLD AUTO: 0 K/UL (ref 0–0.04)
IMM GRANULOCYTES NFR BLD AUTO: 0 % (ref 0–0.5)
INR PPP: 1 (ref 0.9–1.1)
LYMPHOCYTES # BLD: 1.7 K/UL (ref 0.8–3.5)
LYMPHOCYTES NFR BLD: 23 % (ref 12–49)
MCH RBC QN AUTO: 31 PG (ref 26–34)
MCHC RBC AUTO-ENTMCNC: 32.3 G/DL (ref 30–36.5)
MCV RBC AUTO: 96.2 FL (ref 80–99)
MONOCYTES # BLD: 0.6 K/UL (ref 0–1)
MONOCYTES NFR BLD: 8 % (ref 5–13)
NEUTS SEG # BLD: 4.8 K/UL (ref 1.8–8)
NEUTS SEG NFR BLD: 65 % (ref 32–75)
NRBC # BLD: 0 K/UL (ref 0–0.01)
NRBC BLD-RTO: 0 PER 100 WBC
PLATELET # BLD AUTO: 191 K/UL (ref 150–400)
PMV BLD AUTO: 10 FL (ref 8.9–12.9)
POTASSIUM SERPL-SCNC: 3.8 MMOL/L (ref 3.5–5.1)
PROT SERPL-MCNC: 7.6 G/DL (ref 6.4–8.2)
PROTHROMBIN TIME: 10.7 SEC (ref 9–11.1)
RBC # BLD AUTO: 4.19 M/UL (ref 4.1–5.7)
SODIUM SERPL-SCNC: 142 MMOL/L (ref 136–145)
TROPONIN I SERPL HS-MCNC: 15 NG/L (ref 0–57)
TROPONIN I SERPL HS-MCNC: 16 NG/L (ref 0–57)
WBC # BLD AUTO: 7.4 K/UL (ref 4.1–11.1)

## 2023-05-31 PROCEDURE — 93005 ELECTROCARDIOGRAM TRACING: CPT | Performed by: EMERGENCY MEDICINE

## 2023-05-31 PROCEDURE — 85025 COMPLETE CBC W/AUTO DIFF WBC: CPT

## 2023-05-31 PROCEDURE — 99285 EMERGENCY DEPT VISIT HI MDM: CPT

## 2023-05-31 PROCEDURE — 36415 COLL VENOUS BLD VENIPUNCTURE: CPT

## 2023-05-31 PROCEDURE — 84484 ASSAY OF TROPONIN QUANT: CPT

## 2023-05-31 PROCEDURE — 85610 PROTHROMBIN TIME: CPT

## 2023-05-31 PROCEDURE — 71046 X-RAY EXAM CHEST 2 VIEWS: CPT

## 2023-05-31 PROCEDURE — 80053 COMPREHEN METABOLIC PANEL: CPT

## 2023-05-31 ASSESSMENT — PAIN SCALES - GENERAL: PAINLEVEL_OUTOF10: 0

## 2023-05-31 NOTE — ED PROVIDER NOTES
1.829 m (6')            Medical Decision Making  Amount and/or Complexity of Data Reviewed  Independent Historian: spouse  External Data Reviewed: notes. Labs: ordered. Radiology: ordered. ECG/medicine tests: ordered. REASSESSMENT          Mr. Jin Denny is an [de-identified] male to the ER with complaints of mild chest discomfort and hypertension. His blood pressure is much improved. He did not receive any medical intervention for this. His troponins are stable. His symptoms are atypical for ACS. He is to follow-up with his PCP and cardiology or return to the ER with any new or worsening symptoms. CONSULTS:  None    PROCEDURES:  Unless otherwise noted below, none     Procedures      FINAL IMPRESSION    No diagnosis found. DISPOSITION/PLAN   DISPOSITION        PATIENT REFERRED TO:  No follow-up provider specified.     DISCHARGE MEDICATIONS:  New Prescriptions    No medications on file         (Please note that portions of this note were completed with a voice recognition program.  Efforts were made to edit the dictations but occasionally words are mis-transcribed.)    Carlos Fay MD (electronically signed)  Emergency Attending Physician / Physician Assistant / Nurse Practitioner             Zheng Chopra MD  05/31/23 0198

## 2023-05-31 NOTE — ED TRIAGE NOTES
Pt ambulatory to triage for c/o high blood pressure and chest pressure over night. Denies pain/pressure at this time. Denies SOB. Pt states he took BP at home and it was in 200s. Referred to ED by his GP.

## 2023-06-01 PROBLEM — R07.89 OTHER CHEST PAIN: Status: ACTIVE | Noted: 2023-06-01

## 2023-06-01 PROBLEM — R07.89 OTHER CHEST PAIN: Status: RESOLVED | Noted: 2023-06-01 | Resolved: 2023-06-01
